# Patient Record
Sex: MALE | Race: WHITE | NOT HISPANIC OR LATINO
[De-identification: names, ages, dates, MRNs, and addresses within clinical notes are randomized per-mention and may not be internally consistent; named-entity substitution may affect disease eponyms.]

---

## 2022-05-31 ENCOUNTER — APPOINTMENT (OUTPATIENT)
Dept: ORTHOPEDIC SURGERY | Facility: CLINIC | Age: 82
End: 2022-05-31
Payer: MEDICARE

## 2022-05-31 VITALS — HEIGHT: 70 IN | WEIGHT: 165 LBS | BODY MASS INDEX: 23.62 KG/M2

## 2022-05-31 DIAGNOSIS — I10 ESSENTIAL (PRIMARY) HYPERTENSION: ICD-10-CM

## 2022-05-31 PROCEDURE — 20611 DRAIN/INJ JOINT/BURSA W/US: CPT

## 2022-05-31 PROCEDURE — 99213 OFFICE O/P EST LOW 20 MIN: CPT | Mod: 25

## 2022-05-31 RX ORDER — AMLODIPINE BESYLATE 10 MG/1
10 TABLET ORAL
Qty: 90 | Refills: 0 | Status: ACTIVE | COMMUNITY
Start: 2022-01-14

## 2022-05-31 RX ORDER — ATORVASTATIN CALCIUM 10 MG/1
10 TABLET, FILM COATED ORAL
Qty: 90 | Refills: 0 | Status: ACTIVE | COMMUNITY
Start: 2021-11-15

## 2022-05-31 RX ORDER — TROSPIUM CHLORIDE 20 MG/1
20 TABLET, FILM COATED ORAL
Qty: 180 | Refills: 0 | Status: ACTIVE | COMMUNITY
Start: 2021-10-11

## 2022-05-31 RX ORDER — NITROFURANTOIN (MONOHYDRATE/MACROCRYSTALS) 25; 75 MG/1; MG/1
100 CAPSULE ORAL
Qty: 10 | Refills: 0 | Status: ACTIVE | COMMUNITY
Start: 2022-04-18

## 2022-05-31 RX ORDER — TAMSULOSIN HYDROCHLORIDE 0.4 MG/1
0.4 CAPSULE ORAL
Qty: 180 | Refills: 0 | Status: ACTIVE | COMMUNITY
Start: 2021-10-11

## 2022-05-31 RX ORDER — LEVOCETIRIZINE DIHYDROCHLORIDE 5 MG/1
5 TABLET ORAL
Qty: 90 | Refills: 0 | Status: ACTIVE | COMMUNITY
Start: 2021-12-09

## 2022-05-31 RX ORDER — CIPROFLOXACIN HYDROCHLORIDE 500 MG/1
500 TABLET, FILM COATED ORAL
Qty: 14 | Refills: 0 | Status: ACTIVE | COMMUNITY
Start: 2022-04-12

## 2022-05-31 RX ORDER — CLONAZEPAM 0.5 MG/1
0.5 TABLET ORAL
Qty: 15 | Refills: 0 | Status: ACTIVE | COMMUNITY
Start: 2022-04-25

## 2022-05-31 RX ORDER — PANTOPRAZOLE 40 MG/1
40 TABLET, DELAYED RELEASE ORAL
Qty: 90 | Refills: 0 | Status: ACTIVE | COMMUNITY
Start: 2022-05-05

## 2022-05-31 NOTE — HISTORY OF PRESENT ILLNESS
[6] : 6 [de-identified] : 5/31/22: Here for R knee orthovisc #1.\par \par 3/15/22: Here for follow up, he had an episode of the R knee giving out underneath him. He is limping a little. He is in PT for balance.\par \par 11/23/21: here for b/l knee orthovisc #4.\par \par 11/9/21: here for b/l knee orthovisc #2.\par \par 11/2/21: Here for bilateral knee orthovoisc x 1.\par \par 7/6/21: Bilateral shoulder synvisc #3 today.\par \par 6/29/21: Bilateral shoulder synvisc #2 today.\par \par 6/22/21: Here for b/l shoulder synvisc today.\par \par 6/8/21: Here for follow up. His knees have improved with orthovisc. His right shoulder is more painful after lifting his wife (who passed recently). Pain is anterior.\par \par 3/30/21: Here for bilateral knee orthovisc #4\par \par 3/23/21 Orthovisc #3 bilateral knees.\par \par 3/16/21: Follow up b/l knees. Here for Orthovisc #2\par \par 3/9/21: Here today for b/l knee orthovisc #1.\par \par 3/2/21: Here today for b/l knees. R is worse than left. Prior rounds of visco injections had been giving him excellent relief.\par \par 5/5/2020- Here for right knee Synvisc #3\par \par 3/17/20: Here for R knee, recurring pain and symptoms to repeat visco series. Continues to benefit from visco series shoulders.\par \par 1/24/20: The right knee has become more painful. He had a lot of pain and popping recently.\par \par 9/10/19: Here for R knee orthovisc #4.\par \par 9/3/19: Here for R knee orthovisc #3.\par \par 8/27/19: Here for R knee orthovisc #2.\par \par 8/20/19: Here to start orthovisc series right knee.\par \par 7/9/19: Here for follow up. He had mild relief from the orthovisc in the knee. The shoulders feel great however.\par \par 2/19/19: Here for follow up, orthovisc #4 right knee.\par \par 2/12/19: Here for follow up, orthovisc #3 right knee.\par \par 2/5/19: Here for follow up, orthovisc #2 right knee. 1/29/19: Here to continue Orthovisc series, #4 today. 80% relief with injection so far. Able to sleep on his side now. Now with recurring R knee pain and symptoms. Pain getting up from a seated position and stiffness with motion. Did well with Orthovisc in the past. Requesting to repeat.\par \par 1/22/19: Here for follow up, orthovisc #3.\par \par 1/15/19: Here for follow up, orthovisc b/l shoulders #2. He notes relief on the R side.\par \par 1/8/19: Here for follow up. The injection last visit has worn off in December. He wants to consider the gel. He is also having left shoulder pain, mostly in the morning.\par \par 10/23/18: Here for follow up. He has been improved since the last injection, wore off a few weeks ago.\par \par 7/24/18: 78 y/o RHD male here for the R shoulder. He has had pain for 7-8 years. He has pain in the anterior shoulder, pain with activity. He has had cortisone injections, most recently more than 6 months ago. He also tried PT.\par \par MRI R shoulder: Full-thickness and partial tear of supraspinatus tendon. Low-grade partial tear of infraspinatus and high-grade partial tear of subscapularis tendons. Biceps tendon tear. Mild glenohumeral joint degenerative disease with superior migration. Moderate acromioclavicular joint degenerative disease with superior periarticular ganglion cyst formation. [FreeTextEntry1] : R knee

## 2022-05-31 NOTE — PHYSICAL EXAM
[Right] : right knee [NL (0)] : extension 0 degrees [] : light touch is intact throughout [TWNoteComboBox7] : flexion 120 degrees

## 2022-05-31 NOTE — ASSESSMENT
[FreeTextEntry1] : R RTC arthropathy > L RTC arthropathy.\par Significant improvement in pain and motion with visco series.\par Bilateral GH CSI 6/8/21.\par Prior B/L synvisc with relief.\par \par R > L knee OA.\par Prior orthovisc with relief.\par Bilateral knee CSI given 3/2/21\par B/l knee orthovisc completed on 11/23/21.\par R knee orthovisc #1.\par RTO 1 week to continue\par \par Procedure note:\par Viscosupplementation Injection: X-ray evidence of Osteoarthritis on this or prior visit and Patient has tried OTC's including aspirin, Ibuprofen, Aleve etc or prescription NSAIDs, and/or exercises at home and/ or physical therapy without satisfactory response. \par \par An injection of Orthovisc 2ml was injected into the right knee. after verbal consent using sterile technique. The risks, benefits, and alternatives to Viscosupplementation injection were explained in full to the patient. Risks outlined include but are not limited to infection, sepsis, bleeding, scarring, skin discoloration, temporary increase in pain, syncopal episode, failure to resolve symptoms, allergic reaction, and symptom recurrence. Signs and symptoms of infection reviewed and patient advised to call immediately for redness, fevers, and/or chills. Patient understood the risks. All questions were answered. After discussion of options, patient requested Viscosupplementation. Oral informed consent was obtained and sterile prep of the injection site was performed using alcohol. Sterile technique was utilized for the procedure including the preparation of the solutions used for the injection. Ethyl chloride spray was used topically.  Sterile technique used. Patient tolerated procedure well. Post Procedure Instructions: Patient was advised to call if redness, pain, or fever occur and apply ice for 15 min. out of every hour for the next 12-24 hours as tolerated. patient was advised to rest the joint(s) for 2 days.\par \par Ultrasound Guidance was used for the following reasons: for Glenohumeral injection. \par Ultrasound guided injection was performed of the shoulder, visualization of the needle and placement of injection was performed without complication.\par

## 2022-06-07 ENCOUNTER — APPOINTMENT (OUTPATIENT)
Dept: ORTHOPEDIC SURGERY | Facility: CLINIC | Age: 82
End: 2022-06-07
Payer: MEDICARE

## 2022-06-07 PROCEDURE — 99213 OFFICE O/P EST LOW 20 MIN: CPT | Mod: 25

## 2022-06-07 PROCEDURE — 20611 DRAIN/INJ JOINT/BURSA W/US: CPT

## 2022-06-07 RX ORDER — DICLOFENAC SODIUM 20 MG/G
2 SOLUTION TOPICAL TWICE DAILY
Qty: 3 | Refills: 0 | Status: ACTIVE | COMMUNITY
Start: 2022-06-07 | End: 1900-01-01

## 2022-06-07 NOTE — ASSESSMENT
[FreeTextEntry1] : R RTC arthropathy > L RTC arthropathy.\par Significant improvement in pain and motion with visco series.\par Bilateral GH CSI 6/8/21.\par Prior B/L synvisc with relief.\par \par R > L knee OA.\par Prior orthovisc with relief.\par Bilateral knee CSI given 3/2/21\par B/l knee orthovisc completed on 11/23/21.\par R knee orthovisc #2.\par RTO 1 week to continue\par \par Procedure note:\par Viscosupplementation Injection: X-ray evidence of Osteoarthritis on this or prior visit and Patient has tried OTC's including aspirin, Ibuprofen, Aleve etc or prescription NSAIDs, and/or exercises at home and/ or physical therapy without satisfactory response. \par \par An injection of Orthovisc 2ml was injected into the right knee. after verbal consent using sterile technique. The risks, benefits, and alternatives to Viscosupplementation injection were explained in full to the patient. Risks outlined include but are not limited to infection, sepsis, bleeding, scarring, skin discoloration, temporary increase in pain, syncopal episode, failure to resolve symptoms, allergic reaction, and symptom recurrence. Signs and symptoms of infection reviewed and patient advised to call immediately for redness, fevers, and/or chills. Patient understood the risks. All questions were answered. After discussion of options, patient requested Viscosupplementation. Oral informed consent was obtained and sterile prep of the injection site was performed using alcohol. Sterile technique was utilized for the procedure including the preparation of the solutions used for the injection. Ethyl chloride spray was used topically.  Sterile technique used. Patient tolerated procedure well. Post Procedure Instructions: Patient was advised to call if redness, pain, or fever occur and apply ice for 15 min. out of every hour for the next 12-24 hours as tolerated. patient was advised to rest the joint(s) for 2 days.\par \par Ultrasound Guidance was used for the following reasons: for Glenohumeral injection. \par Ultrasound guided injection was performed of the shoulder, visualization of the needle and placement of injection was performed without complication.\par

## 2022-06-07 NOTE — HISTORY OF PRESENT ILLNESS
[6] : 6 [de-identified] : 6/7/22: Right knee orthovisc #2.\par \par 5/31/22: Here for R knee orthovisc #1.\par \par 3/15/22: Here for follow up, he had an episode of the R knee giving out underneath him. He is limping a little. He is in PT for balance.\par \par 11/23/21: here for b/l knee orthovisc #4.\par \par 11/9/21: here for b/l knee orthovisc #2.\par \par 11/2/21: Here for bilateral knee orthovoisc x 1.\par \par 7/6/21: Bilateral shoulder synvisc #3 today.\par \par 6/29/21: Bilateral shoulder synvisc #2 today.\par \par 6/22/21: Here for b/l shoulder synvisc today.\par \par 6/8/21: Here for follow up. His knees have improved with orthovisc. His right shoulder is more painful after lifting his wife (who passed recently). Pain is anterior.\par \par 3/30/21: Here for bilateral knee orthovisc #4\par \par 3/23/21 Orthovisc #3 bilateral knees.\par \par 3/16/21: Follow up b/l knees. Here for Orthovisc #2\par \par 3/9/21: Here today for b/l knee orthovisc #1.\par \par 3/2/21: Here today for b/l knees. R is worse than left. Prior rounds of visco injections had been giving him excellent relief.\par \par 5/5/2020- Here for right knee Synvisc #3\par \par 3/17/20: Here for R knee, recurring pain and symptoms to repeat visco series. Continues to benefit from visco series shoulders.\par \par 1/24/20: The right knee has become more painful. He had a lot of pain and popping recently.\par \par 9/10/19: Here for R knee orthovisc #4.\par \par 9/3/19: Here for R knee orthovisc #3.\par \par 8/27/19: Here for R knee orthovisc #2.\par \par 8/20/19: Here to start orthovisc series right knee.\par \par 7/9/19: Here for follow up. He had mild relief from the orthovisc in the knee. The shoulders feel great however.\par \par 2/19/19: Here for follow up, orthovisc #4 right knee.\par \par 2/12/19: Here for follow up, orthovisc #3 right knee.\par \par 2/5/19: Here for follow up, orthovisc #2 right knee. 1/29/19: Here to continue Orthovisc series, #4 today. 80% relief with injection so far. Able to sleep on his side now. Now with recurring R knee pain and symptoms. Pain getting up from a seated position and stiffness with motion. Did well with Orthovisc in the past. Requesting to repeat.\par \par 1/22/19: Here for follow up, orthovisc #3.\par \par 1/15/19: Here for follow up, orthovisc b/l shoulders #2. He notes relief on the R side.\par \par 1/8/19: Here for follow up. The injection last visit has worn off in December. He wants to consider the gel. He is also having left shoulder pain, mostly in the morning.\par \par 10/23/18: Here for follow up. He has been improved since the last injection, wore off a few weeks ago.\par \par 7/24/18: 76 y/o RHD male here for the R shoulder. He has had pain for 7-8 years. He has pain in the anterior shoulder, pain with activity. He has had cortisone injections, most recently more than 6 months ago. He also tried PT.\par \par MRI R shoulder: Full-thickness and partial tear of supraspinatus tendon. Low-grade partial tear of infraspinatus and high-grade partial tear of subscapularis tendons. Biceps tendon tear. Mild glenohumeral joint degenerative disease with superior migration. Moderate acromioclavicular joint degenerative disease with superior periarticular ganglion cyst formation. [FreeTextEntry1] : R knee

## 2022-06-14 ENCOUNTER — APPOINTMENT (OUTPATIENT)
Dept: ORTHOPEDIC SURGERY | Facility: CLINIC | Age: 82
End: 2022-06-14
Payer: MEDICARE

## 2022-06-14 VITALS — WEIGHT: 165 LBS | BODY MASS INDEX: 23.62 KG/M2 | HEIGHT: 70 IN

## 2022-06-14 PROCEDURE — 99213 OFFICE O/P EST LOW 20 MIN: CPT | Mod: 25

## 2022-06-14 PROCEDURE — 20611 DRAIN/INJ JOINT/BURSA W/US: CPT

## 2022-06-14 NOTE — ASSESSMENT
[FreeTextEntry1] : R RTC arthropathy > L RTC arthropathy.\par Significant improvement in pain and motion with visco series.\par Bilateral GH CSI 6/8/21.\par Prior B/L synvisc with relief.\par \par R > L knee OA.\par Prior orthovisc with relief.\par Bilateral knee CSI given 3/2/21\par B/l knee orthovisc completed on 11/23/21.\par R knee orthovisc #3.\par RTO 1 week to continue\par \par Procedure note:\par Viscosupplementation Injection: X-ray evidence of Osteoarthritis on this or prior visit and Patient has tried OTC's including aspirin, Ibuprofen, Aleve etc or prescription NSAIDs, and/or exercises at home and/ or physical therapy without satisfactory response. \par \par An injection of Orthovisc 2ml was injected into the right knee. after verbal consent using sterile technique. The risks, benefits, and alternatives to Viscosupplementation injection were explained in full to the patient. Risks outlined include but are not limited to infection, sepsis, bleeding, scarring, skin discoloration, temporary increase in pain, syncopal episode, failure to resolve symptoms, allergic reaction, and symptom recurrence. Signs and symptoms of infection reviewed and patient advised to call immediately for redness, fevers, and/or chills. Patient understood the risks. All questions were answered. After discussion of options, patient requested Viscosupplementation. Oral informed consent was obtained and sterile prep of the injection site was performed using alcohol. Sterile technique was utilized for the procedure including the preparation of the solutions used for the injection. Ethyl chloride spray was used topically.  Sterile technique used. Patient tolerated procedure well. Post Procedure Instructions: Patient was advised to call if redness, pain, or fever occur and apply ice for 15 min. out of every hour for the next 12-24 hours as tolerated. patient was advised to rest the joint(s) for 2 days.\par \par Ultrasound Guidance was used for the following reasons: for Glenohumeral injection. \par Ultrasound guided injection was performed of the shoulder, visualization of the needle and placement of injection was performed without complication.\par

## 2022-06-14 NOTE — HISTORY OF PRESENT ILLNESS
[6] : 6 [de-identified] : 6/14/22: Right knee orthovisc #3.\par \par 6/7/22: Right knee orthovisc #2.\par \par 5/31/22: Here for R knee orthovisc #1.\par \par 3/15/22: Here for follow up, he had an episode of the R knee giving out underneath him. He is limping a little. He is in PT for balance.\par \par 11/23/21: here for b/l knee orthovisc #4.\par \par 11/9/21: here for b/l knee orthovisc #2.\par \par 11/2/21: Here for bilateral knee orthovoisc x 1.\par \par 7/6/21: Bilateral shoulder synvisc #3 today.\par \par 6/29/21: Bilateral shoulder synvisc #2 today.\par \par 6/22/21: Here for b/l shoulder synvisc today.\par \par 6/8/21: Here for follow up. His knees have improved with orthovisc. His right shoulder is more painful after lifting his wife (who passed recently). Pain is anterior.\par \par 3/30/21: Here for bilateral knee orthovisc #4\par \par 3/23/21 Orthovisc #3 bilateral knees.\par \par 3/16/21: Follow up b/l knees. Here for Orthovisc #2\par \par 3/9/21: Here today for b/l knee orthovisc #1.\par \par 3/2/21: Here today for b/l knees. R is worse than left. Prior rounds of visco injections had been giving him excellent relief.\par \par 5/5/2020- Here for right knee Synvisc #3\par \par 3/17/20: Here for R knee, recurring pain and symptoms to repeat visco series. Continues to benefit from visco series shoulders.\par \par 1/24/20: The right knee has become more painful. He had a lot of pain and popping recently.\par \par 9/10/19: Here for R knee orthovisc #4.\par \par 9/3/19: Here for R knee orthovisc #3.\par \par 8/27/19: Here for R knee orthovisc #2.\par \par 8/20/19: Here to start orthovisc series right knee.\par \par 7/9/19: Here for follow up. He had mild relief from the orthovisc in the knee. The shoulders feel great however.\par \par 2/19/19: Here for follow up, orthovisc #4 right knee.\par \par 2/12/19: Here for follow up, orthovisc #3 right knee.\par \par 2/5/19: Here for follow up, orthovisc #2 right knee. 1/29/19: Here to continue Orthovisc series, #4 today. 80% relief with injection so far. Able to sleep on his side now. Now with recurring R knee pain and symptoms. Pain getting up from a seated position and stiffness with motion. Did well with Orthovisc in the past. Requesting to repeat.\par \par 1/22/19: Here for follow up, orthovisc #3.\par \par 1/15/19: Here for follow up, orthovisc b/l shoulders #2. He notes relief on the R side.\par \par 1/8/19: Here for follow up. The injection last visit has worn off in December. He wants to consider the gel. He is also having left shoulder pain, mostly in the morning.\par \par 10/23/18: Here for follow up. He has been improved since the last injection, wore off a few weeks ago.\par \par 7/24/18: 76 y/o RHD male here for the R shoulder. He has had pain for 7-8 years. He has pain in the anterior shoulder, pain with activity. He has had cortisone injections, most recently more than 6 months ago. He also tried PT.\par \par MRI R shoulder: Full-thickness and partial tear of supraspinatus tendon. Low-grade partial tear of infraspinatus and high-grade partial tear of subscapularis tendons. Biceps tendon tear. Mild glenohumeral joint degenerative disease with superior migration. Moderate acromioclavicular joint degenerative disease with superior periarticular ganglion cyst formation. [FreeTextEntry1] : R knee

## 2022-06-21 ENCOUNTER — APPOINTMENT (OUTPATIENT)
Dept: ORTHOPEDIC SURGERY | Facility: CLINIC | Age: 82
End: 2022-06-21
Payer: MEDICARE

## 2022-06-21 VITALS — BODY MASS INDEX: 23.62 KG/M2 | HEIGHT: 70 IN | WEIGHT: 165 LBS

## 2022-06-21 PROCEDURE — 20611 DRAIN/INJ JOINT/BURSA W/US: CPT

## 2022-06-21 PROCEDURE — 99212 OFFICE O/P EST SF 10 MIN: CPT | Mod: 25

## 2022-06-21 NOTE — HISTORY OF PRESENT ILLNESS
[6] : 6 [de-identified] : 6/14/22: Right knee orthovisc #3.\par \par 6/7/22: Right knee orthovisc #2.\par \par 5/31/22: Here for R knee orthovisc #1.\par \par 3/15/22: Here for follow up, he had an episode of the R knee giving out underneath him. He is limping a little. He is in PT for balance.\par \par 11/23/21: here for b/l knee orthovisc #4.\par \par 11/9/21: here for b/l knee orthovisc #2.\par \par 11/2/21: Here for bilateral knee orthovoisc x 1.\par \par 7/6/21: Bilateral shoulder synvisc #3 today.\par \par 6/29/21: Bilateral shoulder synvisc #2 today.\par \par 6/22/21: Here for b/l shoulder synvisc today.\par \par 6/8/21: Here for follow up. His knees have improved with orthovisc. His right shoulder is more painful after lifting his wife (who passed recently). Pain is anterior.\par \par 3/30/21: Here for bilateral knee orthovisc #4\par \par 3/23/21 Orthovisc #3 bilateral knees.\par \par 3/16/21: Follow up b/l knees. Here for Orthovisc #2\par \par 3/9/21: Here today for b/l knee orthovisc #1.\par \par 3/2/21: Here today for b/l knees. R is worse than left. Prior rounds of visco injections had been giving him excellent relief.\par \par 5/5/2020- Here for right knee Synvisc #3\par \par 3/17/20: Here for R knee, recurring pain and symptoms to repeat visco series. Continues to benefit from visco series shoulders.\par \par 1/24/20: The right knee has become more painful. He had a lot of pain and popping recently.\par \par 9/10/19: Here for R knee orthovisc #4.\par \par 9/3/19: Here for R knee orthovisc #3.\par \par 8/27/19: Here for R knee orthovisc #2.\par \par 8/20/19: Here to start orthovisc series right knee.\par \par 7/9/19: Here for follow up. He had mild relief from the orthovisc in the knee. The shoulders feel great however.\par \par 2/19/19: Here for follow up, orthovisc #4 right knee.\par \par 2/12/19: Here for follow up, orthovisc #3 right knee.\par \par 2/5/19: Here for follow up, orthovisc #2 right knee. 1/29/19: Here to continue Orthovisc series, #4 today. 80% relief with injection so far. Able to sleep on his side now. Now with recurring R knee pain and symptoms. Pain getting up from a seated position and stiffness with motion. Did well with Orthovisc in the past. Requesting to repeat.\par \par 1/22/19: Here for follow up, orthovisc #3.\par \par 1/15/19: Here for follow up, orthovisc b/l shoulders #2. He notes relief on the R side.\par \par 1/8/19: Here for follow up. The injection last visit has worn off in December. He wants to consider the gel. He is also having left shoulder pain, mostly in the morning.\par \par 10/23/18: Here for follow up. He has been improved since the last injection, wore off a few weeks ago.\par \par 7/24/18: 76 y/o RHD male here for the R shoulder. He has had pain for 7-8 years. He has pain in the anterior shoulder, pain with activity. He has had cortisone injections, most recently more than 6 months ago. He also tried PT.\par \par MRI R shoulder: Full-thickness and partial tear of supraspinatus tendon. Low-grade partial tear of infraspinatus and high-grade partial tear of subscapularis tendons. Biceps tendon tear. Mild glenohumeral joint degenerative disease with superior migration. Moderate acromioclavicular joint degenerative disease with superior periarticular ganglion cyst formation. [FreeTextEntry1] : R knee

## 2022-06-21 NOTE — ASSESSMENT
[FreeTextEntry1] : R RTC arthropathy > L RTC arthropathy.\par Significant improvement in pain and motion with visco series.\par Bilateral GH CSI 6/8/21.\par Prior B/L synvisc with relief.\par \par R > L knee OA.\par Prior orthovisc with relief.\par Bilateral knee CSI given 3/2/21\par B/l knee orthovisc completed on 11/23/21.\par R knee orthovisc #4.\par RTO prn. \par

## 2023-04-25 ENCOUNTER — APPOINTMENT (OUTPATIENT)
Dept: ORTHOPEDIC SURGERY | Facility: CLINIC | Age: 83
End: 2023-04-25
Payer: MEDICARE

## 2023-04-25 VITALS — WEIGHT: 165 LBS | BODY MASS INDEX: 23.62 KG/M2 | HEIGHT: 70 IN

## 2023-04-25 PROCEDURE — 99214 OFFICE O/P EST MOD 30 MIN: CPT | Mod: 25

## 2023-04-25 PROCEDURE — 20611 DRAIN/INJ JOINT/BURSA W/US: CPT | Mod: 50

## 2023-04-25 PROCEDURE — J3490M: CUSTOM

## 2023-04-25 PROCEDURE — 73030 X-RAY EXAM OF SHOULDER: CPT | Mod: RT

## 2023-04-25 PROCEDURE — 73010 X-RAY EXAM OF SHOULDER BLADE: CPT | Mod: RT

## 2023-04-25 NOTE — HISTORY OF PRESENT ILLNESS
[6] : 6 [de-identified] : 4/25/23: Here today for bilateral shoulders.  He has pain at night.  He takes aleve.  He feels pain deep in the shoulder, some down the arm.  \par \par 6/14/22: Right knee orthovisc #3.\par \par 6/7/22: Right knee orthovisc #2.\par \par 5/31/22: Here for R knee orthovisc #1.\par \par 3/15/22: Here for follow up, he had an episode of the R knee giving out underneath him. He is limping a little. He is in PT for balance.\par \par 11/23/21: here for b/l knee orthovisc #4.\par \par 11/9/21: here for b/l knee orthovisc #2.\par \par 11/2/21: Here for bilateral knee orthovoisc x 1.\par \par 7/6/21: Bilateral shoulder synvisc #3 today.\par \par 6/29/21: Bilateral shoulder synvisc #2 today.\par \par 6/22/21: Here for b/l shoulder synvisc today.\par \par 6/8/21: Here for follow up. His knees have improved with orthovisc. His right shoulder is more painful after lifting his wife (who passed recently). Pain is anterior.\par \par 3/30/21: Here for bilateral knee orthovisc #4\par \par 3/23/21 Orthovisc #3 bilateral knees.\par \par 3/16/21: Follow up b/l knees. Here for Orthovisc #2\par \par 3/9/21: Here today for b/l knee orthovisc #1.\par \par 3/2/21: Here today for b/l knees. R is worse than left. Prior rounds of visco injections had been giving him excellent relief.\par \par 5/5/2020- Here for right knee Synvisc #3\par \par 3/17/20: Here for R knee, recurring pain and symptoms to repeat visco series. Continues to benefit from visco series shoulders.\par \par 1/24/20: The right knee has become more painful. He had a lot of pain and popping recently.\par \par 9/10/19: Here for R knee orthovisc #4.\par \par 9/3/19: Here for R knee orthovisc #3.\par \par 8/27/19: Here for R knee orthovisc #2.\par \par 8/20/19: Here to start orthovisc series right knee.\par \par 7/9/19: Here for follow up. He had mild relief from the orthovisc in the knee. The shoulders feel great however.\par \par 2/19/19: Here for follow up, orthovisc #4 right knee.\par \par 2/12/19: Here for follow up, orthovisc #3 right knee.\par \par 2/5/19: Here for follow up, orthovisc #2 right knee. 1/29/19: Here to continue Orthovisc series, #4 today. 80% relief with injection so far. Able to sleep on his side now. Now with recurring R knee pain and symptoms. Pain getting up from a seated position and stiffness with motion. Did well with Orthovisc in the past. Requesting to repeat.\par \par 1/22/19: Here for follow up, orthovisc #3.\par \par 1/15/19: Here for follow up, orthovisc b/l shoulders #2. He notes relief on the R side.\par \par 1/8/19: Here for follow up. The injection last visit has worn off in December. He wants to consider the gel. He is also having left shoulder pain, mostly in the morning.\par \par 10/23/18: Here for follow up. He has been improved since the last injection, wore off a few weeks ago.\par \par 7/24/18: 78 y/o RHD male here for the R shoulder. He has had pain for 7-8 years. He has pain in the anterior shoulder, pain with activity. He has had cortisone injections, most recently more than 6 months ago. He also tried PT.\par \par MRI R shoulder: Full-thickness and partial tear of supraspinatus tendon. Low-grade partial tear of infraspinatus and high-grade partial tear of subscapularis tendons. Biceps tendon tear. Mild glenohumeral joint degenerative disease with superior migration. Moderate acromioclavicular joint degenerative disease with superior periarticular ganglion cyst formation. [FreeTextEntry1] : R knee

## 2023-04-25 NOTE — PHYSICAL EXAM
[Bilateral] : shoulder bilaterally [5 ___] : forward flexion 5[unfilled]/5 [5___] : external rotation 5[unfilled]/5 [] : positive Mary Carmen [FreeTextEntry9] : FE: 120 b/l

## 2023-04-25 NOTE — ASSESSMENT
[FreeTextEntry1] : R RTC arthropathy > L RTC arthropathy.\par Significant improvement in pain and motion with visco series.\par Prior B/L synvisc with relief.\par B/l SA injections today.\par Consider orthovisc series. \par \par R > L knee OA.\par Prior orthovisc with relief.\par Bilateral knee CSI given 3/2/21\par R knee orthovisc completed 6/21/22.\par We will submit for orthovisc R knee x 4\par RTO prn. \par \par Procedure Note:\par Large Joint Injection was performed because of pain and inflammation, failure of conservative treatment.  \par Medications:\par Depo-Medrol: 1 cc, 80 mg.\par Lidocaine: 2 cc, 1%. \par Marcaine: 2 cc, .25%. \par \par Medication was injected in the bilateral subacromial spaces. Patient has tried OTC's including aspirin, Ibuprofen, Aleve etc or prescription NSAIDs, and/or exercises at home and/ or physical therapy without satisfactory response. The risks, benefits, and alternatives to cortisone injection were explained in full to the patient. Risks outlined include but are not limited to infection, sepsis, bleeding, scarring, skin discoloration, temporary increase in pain, syncopal episode, failure to resolve symptoms, allergic reaction, symptom recurrence, and elevation of blood sugar in diabetics. Patient understood the risks. All questions were answered. After discussion of options, patient requested an injection. Oral informed consent was obtained and sterile prep of the injection site was performed using alcohol. Sterile technique was utilized for the procedure including the preparation of the solutions used for the injection. Ethyl chloride spray was used topically.  Sterile technique used. Patient tolerated procedure well. Post Procedure Instructions: Patient was advised to call if redness, pain, or fever occur and apply ice for 15 min. out of every hour for the next 12-24 hours as tolerated. patient was advised to rest the joint(s) for 2 days.\par \par Ultrasound Guidance was used for the following reasons: for prior failure or difficult injection and to visualize tearing and inflammation.\par Ultrasound guided injection was performed of the shoulder, visualization of the needle and placement of injection was performed without complication.\par

## 2023-05-09 ENCOUNTER — APPOINTMENT (OUTPATIENT)
Dept: ORTHOPEDIC SURGERY | Facility: CLINIC | Age: 83
End: 2023-05-09
Payer: MEDICARE

## 2023-05-09 VITALS — WEIGHT: 165 LBS | BODY MASS INDEX: 23.62 KG/M2 | HEIGHT: 70 IN

## 2023-05-09 PROCEDURE — 20611 DRAIN/INJ JOINT/BURSA W/US: CPT | Mod: RT

## 2023-05-09 NOTE — PHYSICAL EXAM
[Bilateral] : shoulder bilaterally [5 ___] : forward flexion 5[unfilled]/5 [5___] : external rotation 5[unfilled]/5 [] : no erythema [FreeTextEntry9] : FE: 120 b/l

## 2023-05-09 NOTE — HISTORY OF PRESENT ILLNESS
[6] : 6 [de-identified] : 5/9/23: Here for right knee orthovisc #1. He states injections to shoulders last time helped his right but not left.\par \par 4/25/23: Here today for bilateral shoulders.  He has pain at night.  He takes aleve.  He feels pain deep in the shoulder, some down the arm.  \par \par 6/14/22: Right knee orthovisc #3.\par \par 6/7/22: Right knee orthovisc #2.\par \par 5/31/22: Here for R knee orthovisc #1.\par \par 3/15/22: Here for follow up, he had an episode of the R knee giving out underneath him. He is limping a little. He is in PT for balance.\par \par 11/23/21: here for b/l knee orthovisc #4.\par \par 11/9/21: here for b/l knee orthovisc #2.\par \par 11/2/21: Here for bilateral knee orthovoisc x 1.\par \par 7/6/21: Bilateral shoulder synvisc #3 today.\par \par 6/29/21: Bilateral shoulder synvisc #2 today.\par \par 6/22/21: Here for b/l shoulder synvisc today.\par \par 6/8/21: Here for follow up. His knees have improved with orthovisc. His right shoulder is more painful after lifting his wife (who passed recently). Pain is anterior.\par \par 3/30/21: Here for bilateral knee orthovisc #4\par \par 3/23/21 Orthovisc #3 bilateral knees.\par \par 3/16/21: Follow up b/l knees. Here for Orthovisc #2\par \par 3/9/21: Here today for b/l knee orthovisc #1.\par \par 3/2/21: Here today for b/l knees. R is worse than left. Prior rounds of visco injections had been giving him excellent relief.\par \par 5/5/2020- Here for right knee Synvisc #3\par \par 3/17/20: Here for R knee, recurring pain and symptoms to repeat visco series. Continues to benefit from visco series shoulders.\par \par 1/24/20: The right knee has become more painful. He had a lot of pain and popping recently.\par \par 9/10/19: Here for R knee orthovisc #4.\par \par 9/3/19: Here for R knee orthovisc #3.\par \par 8/27/19: Here for R knee orthovisc #2.\par \par 8/20/19: Here to start orthovisc series right knee.\par \par 7/9/19: Here for follow up. He had mild relief from the orthovisc in the knee. The shoulders feel great however.\par \par 2/19/19: Here for follow up, orthovisc #4 right knee.\par \par 2/12/19: Here for follow up, orthovisc #3 right knee.\par \par 2/5/19: Here for follow up, orthovisc #2 right knee. 1/29/19: Here to continue Orthovisc series, #4 today. 80% relief with injection so far. Able to sleep on his side now. Now with recurring R knee pain and symptoms. Pain getting up from a seated position and stiffness with motion. Did well with Orthovisc in the past. Requesting to repeat.\par \par 1/22/19: Here for follow up, orthovisc #3.\par \par 1/15/19: Here for follow up, orthovisc b/l shoulders #2. He notes relief on the R side.\par \par 1/8/19: Here for follow up. The injection last visit has worn off in December. He wants to consider the gel. He is also having left shoulder pain, mostly in the morning.\par \par 10/23/18: Here for follow up. He has been improved since the last injection, wore off a few weeks ago.\par \par 7/24/18: 78 y/o RHD male here for the R shoulder. He has had pain for 7-8 years. He has pain in the anterior shoulder, pain with activity. He has had cortisone injections, most recently more than 6 months ago. He also tried PT.\par \par MRI R shoulder: Full-thickness and partial tear of supraspinatus tendon. Low-grade partial tear of infraspinatus and high-grade partial tear of subscapularis tendons. Biceps tendon tear. Mild glenohumeral joint degenerative disease with superior migration. Moderate acromioclavicular joint degenerative disease with superior periarticular ganglion cyst formation. [FreeTextEntry1] : R knee

## 2023-05-09 NOTE — ASSESSMENT
[FreeTextEntry1] : R RTC arthropathy > L RTC arthropathy.\par Significant improvement in pain and motion with visco series.\par Prior B/L synvisc with relief.\par B/l SA injections 4/25/23.\par Consider orthovisc series. \par \par R > L knee OA.\par Prior orthovisc with relief.\par Bilateral knee CSI given 3/2/21\par R knee orthovisc completed 6/21/22.\par Orthovisc R knee #1 given.\par RTO 1 week.\par \par Procedure note:\par Viscosupplementation Injection: X-ray evidence of Osteoarthritis on this or prior visit and Patient has tried OTC's including aspirin, Ibuprofen, Aleve etc or prescription NSAIDs, and/or exercises at home and/ or physical therapy without satisfactory response. \par \par An injection of Orthovisc 2ml was injected into the right knee. after verbal consent using sterile technique. The risks, benefits, and alternatives to Viscosupplementation injection were explained in full to the patient. Risks outlined include but are not limited to infection, sepsis, bleeding, scarring, skin discoloration, temporary increase in pain, syncopal episode, failure to resolve symptoms, allergic reaction, and symptom recurrence. Signs and symptoms of infection reviewed and patient advised to call immediately for redness, fevers, and/or chills. Patient understood the risks. All questions were answered. After discussion of options, patient requested Viscosupplementation. Oral informed consent was obtained and sterile prep of the injection site was performed using alcohol. Sterile technique was utilized for the procedure including the preparation of the solutions used for the injection. Ethyl chloride spray was used topically.  Sterile technique used. Patient tolerated procedure well. Post Procedure Instructions: Patient was advised to call if redness, pain, or fever occur and apply ice for 15 min. out of every hour for the next 12-24 hours as tolerated. patient was advised to rest the joint(s) for 2 days.\par \par Ultrasound Guidance was used for the following reasons: for Glenohumeral injection. \par Ultrasound guided injection was performed of the shoulder, visualization of the needle and placement of injection was performed without complication.\par

## 2023-05-16 ENCOUNTER — APPOINTMENT (OUTPATIENT)
Dept: ORTHOPEDIC SURGERY | Facility: CLINIC | Age: 83
End: 2023-05-16
Payer: MEDICARE

## 2023-05-16 VITALS — HEIGHT: 70 IN | WEIGHT: 165 LBS | BODY MASS INDEX: 23.62 KG/M2

## 2023-05-16 DIAGNOSIS — Z78.9 OTHER SPECIFIED HEALTH STATUS: ICD-10-CM

## 2023-05-16 PROCEDURE — 99213 OFFICE O/P EST LOW 20 MIN: CPT | Mod: 25

## 2023-05-16 PROCEDURE — 20611 DRAIN/INJ JOINT/BURSA W/US: CPT | Mod: RT

## 2023-05-16 RX ORDER — DICLOFENAC SODIUM 20 MG/G
2 SOLUTION TOPICAL TWICE DAILY
Qty: 1 | Refills: 3 | Status: ACTIVE | COMMUNITY
Start: 2022-06-21 | End: 1900-01-01

## 2023-05-16 NOTE — HISTORY OF PRESENT ILLNESS
[6] : 6 [de-identified] : 5/16/23: Right knee orthovisc #2.\par \par 5/9/23: Here for right knee orthovisc #1. He states injections to shoulders last time helped his right but not left.\par \par 4/25/23: Here today for bilateral shoulders.  He has pain at night.  He takes aleve.  He feels pain deep in the shoulder, some down the arm.  \par \par 6/14/22: Right knee orthovisc #3.\par \par 6/7/22: Right knee orthovisc #2.\par \par 5/31/22: Here for R knee orthovisc #1.\par \par 3/15/22: Here for follow up, he had an episode of the R knee giving out underneath him. He is limping a little. He is in PT for balance.\par \par 11/23/21: here for b/l knee orthovisc #4.\par \par 11/9/21: here for b/l knee orthovisc #2.\par \par 11/2/21: Here for bilateral knee orthovoisc x 1.\par \par 7/6/21: Bilateral shoulder synvisc #3 today.\par \par 6/29/21: Bilateral shoulder synvisc #2 today.\par \par 6/22/21: Here for b/l shoulder synvisc today.\par \par 6/8/21: Here for follow up. His knees have improved with orthovisc. His right shoulder is more painful after lifting his wife (who passed recently). Pain is anterior.\par \par 3/30/21: Here for bilateral knee orthovisc #4\par \par 3/23/21 Orthovisc #3 bilateral knees.\par \par 3/16/21: Follow up b/l knees. Here for Orthovisc #2\par \par 3/9/21: Here today for b/l knee orthovisc #1.\par \par 3/2/21: Here today for b/l knees. R is worse than left. Prior rounds of visco injections had been giving him excellent relief.\par \par 5/5/2020- Here for right knee Synvisc #3\par \par 3/17/20: Here for R knee, recurring pain and symptoms to repeat visco series. Continues to benefit from visco series shoulders.\par \par 1/24/20: The right knee has become more painful. He had a lot of pain and popping recently.\par \par 9/10/19: Here for R knee orthovisc #4.\par \par 9/3/19: Here for R knee orthovisc #3.\par \par 8/27/19: Here for R knee orthovisc #2.\par \par 8/20/19: Here to start orthovisc series right knee.\par \par 7/9/19: Here for follow up. He had mild relief from the orthovisc in the knee. The shoulders feel great however.\par \par 2/19/19: Here for follow up, orthovisc #4 right knee.\par \par 2/12/19: Here for follow up, orthovisc #3 right knee.\par \par 2/5/19: Here for follow up, orthovisc #2 right knee. 1/29/19: Here to continue Orthovisc series, #4 today. 80% relief with injection so far. Able to sleep on his side now. Now with recurring R knee pain and symptoms. Pain getting up from a seated position and stiffness with motion. Did well with Orthovisc in the past. Requesting to repeat.\par \par 1/22/19: Here for follow up, orthovisc #3.\par \par 1/15/19: Here for follow up, orthovisc b/l shoulders #2. He notes relief on the R side.\par \par 1/8/19: Here for follow up. The injection last visit has worn off in December. He wants to consider the gel. He is also having left shoulder pain, mostly in the morning.\par \par 10/23/18: Here for follow up. He has been improved since the last injection, wore off a few weeks ago.\par \par 7/24/18: 76 y/o RHD male here for the R shoulder. He has had pain for 7-8 years. He has pain in the anterior shoulder, pain with activity. He has had cortisone injections, most recently more than 6 months ago. He also tried PT.\par \par MRI R shoulder: Full-thickness and partial tear of supraspinatus tendon. Low-grade partial tear of infraspinatus and high-grade partial tear of subscapularis tendons. Biceps tendon tear. Mild glenohumeral joint degenerative disease with superior migration. Moderate acromioclavicular joint degenerative disease with superior periarticular ganglion cyst formation. [FreeTextEntry1] : R knee

## 2023-05-16 NOTE — ASSESSMENT
[FreeTextEntry1] : R RTC arthropathy > L RTC arthropathy.\par Significant improvement in pain and motion with visco series.\par Prior B/L synvisc with relief.\par B/l SA injections 4/25/23.\par Consider orthovisc series. \par \par R > L knee OA.\par Prior orthovisc with relief.\par Bilateral knee CSI given 3/2/21\par R knee orthovisc completed 6/21/22.\par Orthovisc R knee #2 given.\par Rx for diclofenac gel.\par RTO 1 week.\par \par Procedure note:\par Viscosupplementation Injection: X-ray evidence of Osteoarthritis on this or prior visit and Patient has tried OTC's including aspirin, Ibuprofen, Aleve etc or prescription NSAIDs, and/or exercises at home and/ or physical therapy without satisfactory response. \par \par An injection of Orthovisc 2ml was injected into the right knee. after verbal consent using sterile technique. The risks, benefits, and alternatives to Viscosupplementation injection were explained in full to the patient. Risks outlined include but are not limited to infection, sepsis, bleeding, scarring, skin discoloration, temporary increase in pain, syncopal episode, failure to resolve symptoms, allergic reaction, and symptom recurrence. Signs and symptoms of infection reviewed and patient advised to call immediately for redness, fevers, and/or chills. Patient understood the risks. All questions were answered. After discussion of options, patient requested Viscosupplementation. Oral informed consent was obtained and sterile prep of the injection site was performed using alcohol. Sterile technique was utilized for the procedure including the preparation of the solutions used for the injection. Ethyl chloride spray was used topically.  Sterile technique used. Patient tolerated procedure well. Post Procedure Instructions: Patient was advised to call if redness, pain, or fever occur and apply ice for 15 min. out of every hour for the next 12-24 hours as tolerated. patient was advised to rest the joint(s) for 2 days.\par \par Ultrasound Guidance was used for the following reasons: for erosive arthritis. \par Ultrasound guided injection was performed of the knee, visualization of the needle and placement of injection was performed without complication.\par

## 2023-05-16 NOTE — DISCUSSION/SUMMARY
[de-identified] : Progress note completed by JAMES Pate under the direct supervision of Doyle Matt M.D.\par

## 2023-05-23 ENCOUNTER — APPOINTMENT (OUTPATIENT)
Dept: ORTHOPEDIC SURGERY | Facility: CLINIC | Age: 83
End: 2023-05-23
Payer: MEDICARE

## 2023-05-23 VITALS — WEIGHT: 165 LBS | BODY MASS INDEX: 23.62 KG/M2 | HEIGHT: 70 IN

## 2023-05-23 PROCEDURE — 20611 DRAIN/INJ JOINT/BURSA W/US: CPT | Mod: RT

## 2023-05-23 NOTE — HISTORY OF PRESENT ILLNESS
[6] : 6 [de-identified] : 5/23/23: Here for R knee orthovisc #3.\par \par 5/16/23: Right knee orthovisc #2.\par \par 5/9/23: Here for right knee orthovisc #1. He states injections to shoulders last time helped his right but not left.\par \par 4/25/23: Here today for bilateral shoulders.  He has pain at night.  He takes aleve.  He feels pain deep in the shoulder, some down the arm.  \par \par 6/14/22: Right knee orthovisc #3.\par \par 6/7/22: Right knee orthovisc #2.\par \par 5/31/22: Here for R knee orthovisc #1.\par \par 3/15/22: Here for follow up, he had an episode of the R knee giving out underneath him. He is limping a little. He is in PT for balance.\par \par 11/23/21: here for b/l knee orthovisc #4.\par \par 11/9/21: here for b/l knee orthovisc #2.\par \par 11/2/21: Here for bilateral knee orthovoisc x 1.\par \par 7/6/21: Bilateral shoulder synvisc #3 today.\par \par 6/29/21: Bilateral shoulder synvisc #2 today.\par \par 6/22/21: Here for b/l shoulder synvisc today.\par \par 6/8/21: Here for follow up. His knees have improved with orthovisc. His right shoulder is more painful after lifting his wife (who passed recently). Pain is anterior.\par \par 3/30/21: Here for bilateral knee orthovisc #4\par \par 3/23/21 Orthovisc #3 bilateral knees.\par \par 3/16/21: Follow up b/l knees. Here for Orthovisc #2\par \par 3/9/21: Here today for b/l knee orthovisc #1.\par \par 3/2/21: Here today for b/l knees. R is worse than left. Prior rounds of visco injections had been giving him excellent relief.\par \par 5/5/2020- Here for right knee Synvisc #3\par \par 3/17/20: Here for R knee, recurring pain and symptoms to repeat visco series. Continues to benefit from visco series shoulders.\par \par 1/24/20: The right knee has become more painful. He had a lot of pain and popping recently.\par \par 9/10/19: Here for R knee orthovisc #4.\par \par 9/3/19: Here for R knee orthovisc #3.\par \par 8/27/19: Here for R knee orthovisc #2.\par \par 8/20/19: Here to start orthovisc series right knee.\par \par 7/9/19: Here for follow up. He had mild relief from the orthovisc in the knee. The shoulders feel great however.\par \par 2/19/19: Here for follow up, orthovisc #4 right knee.\par \par 2/12/19: Here for follow up, orthovisc #3 right knee.\par \par 2/5/19: Here for follow up, orthovisc #2 right knee. 1/29/19: Here to continue Orthovisc series, #4 today. 80% relief with injection so far. Able to sleep on his side now. Now with recurring R knee pain and symptoms. Pain getting up from a seated position and stiffness with motion. Did well with Orthovisc in the past. Requesting to repeat.\par \par 1/22/19: Here for follow up, orthovisc #3.\par \par 1/15/19: Here for follow up, orthovisc b/l shoulders #2. He notes relief on the R side.\par \par 1/8/19: Here for follow up. The injection last visit has worn off in December. He wants to consider the gel. He is also having left shoulder pain, mostly in the morning.\par \par 10/23/18: Here for follow up. He has been improved since the last injection, wore off a few weeks ago.\par \par 7/24/18: 78 y/o RHD male here for the R shoulder. He has had pain for 7-8 years. He has pain in the anterior shoulder, pain with activity. He has had cortisone injections, most recently more than 6 months ago. He also tried PT.\par \par MRI R shoulder: Full-thickness and partial tear of supraspinatus tendon. Low-grade partial tear of infraspinatus and high-grade partial tear of subscapularis tendons. Biceps tendon tear. Mild glenohumeral joint degenerative disease with superior migration. Moderate acromioclavicular joint degenerative disease with superior periarticular ganglion cyst formation. [FreeTextEntry1] : R knee

## 2023-05-23 NOTE — ASSESSMENT
[FreeTextEntry1] : R RTC arthropathy > L RTC arthropathy.\par Significant improvement in pain and motion with visco series.\par Prior B/L synvisc with relief.\par B/l SA injections 4/25/23.\par Request auth for orthovisc series. \par \par R > L knee OA.\par Prior orthovisc with relief.\par Bilateral knee CSI given 3/2/21\par R knee orthovisc completed 6/21/22.\par Orthovisc R knee #3 given.\par Rx for diclofenac gel.\par RTO 1 week.\par \par Procedure note:\par Viscosupplementation Injection: X-ray evidence of Osteoarthritis on this or prior visit and Patient has tried OTC's including aspirin, Ibuprofen, Aleve etc or prescription NSAIDs, and/or exercises at home and/ or physical therapy without satisfactory response. \par \par An injection of Orthovisc 2ml was injected into the right knee. after verbal consent using sterile technique. The risks, benefits, and alternatives to Viscosupplementation injection were explained in full to the patient. Risks outlined include but are not limited to infection, sepsis, bleeding, scarring, skin discoloration, temporary increase in pain, syncopal episode, failure to resolve symptoms, allergic reaction, and symptom recurrence. Signs and symptoms of infection reviewed and patient advised to call immediately for redness, fevers, and/or chills. Patient understood the risks. All questions were answered. After discussion of options, patient requested Viscosupplementation. Oral informed consent was obtained and sterile prep of the injection site was performed using alcohol. Sterile technique was utilized for the procedure including the preparation of the solutions used for the injection. Ethyl chloride spray was used topically.  Sterile technique used. Patient tolerated procedure well. Post Procedure Instructions: Patient was advised to call if redness, pain, or fever occur and apply ice for 15 min. out of every hour for the next 12-24 hours as tolerated. patient was advised to rest the joint(s) for 2 days.\par \par Ultrasound Guidance was used for the following reasons: for erosive arthritis. \par Ultrasound guided injection was performed of the knee, visualization of the needle and placement of injection was performed without complication.\par

## 2023-05-30 ENCOUNTER — APPOINTMENT (OUTPATIENT)
Dept: ORTHOPEDIC SURGERY | Facility: CLINIC | Age: 83
End: 2023-05-30
Payer: MEDICARE

## 2023-05-30 VITALS — WEIGHT: 165 LBS | HEIGHT: 70 IN | BODY MASS INDEX: 23.62 KG/M2

## 2023-05-30 PROCEDURE — 20611 DRAIN/INJ JOINT/BURSA W/US: CPT | Mod: LT

## 2023-05-30 PROCEDURE — 99213 OFFICE O/P EST LOW 20 MIN: CPT | Mod: 25

## 2023-05-30 NOTE — ASSESSMENT
[FreeTextEntry1] : R RTC arthropathy > L RTC arthropathy.\par Significant improvement in pain and motion with visco series.\par Prior B/L synvisc with relief.\par B/l SA injections 4/25/23.\par L shoulder orthovisc #1 given.\par RTO 1 week to continue series.\par \par R > L knee OA.\par Prior orthovisc with relief.\par Bilateral knee CSI given 3/2/21\par R knee orthovisc completed 6/21/22.\par Orthovisc R knee #4 given.\par Rx for diclofenac gel.\par RTO prn.\par \par Procedure note:\par Viscosupplementation Injection: X-ray evidence of Osteoarthritis on this or prior visit and Patient has tried OTC's including aspirin, Ibuprofen, Aleve etc or prescription NSAIDs, and/or exercises at home and/ or physical therapy without satisfactory response. \par \par An injection of Orthovisc 2ml was injected into the right knee. after verbal consent using sterile technique. The risks, benefits, and alternatives to Viscosupplementation injection were explained in full to the patient. Risks outlined include but are not limited to infection, sepsis, bleeding, scarring, skin discoloration, temporary increase in pain, syncopal episode, failure to resolve symptoms, allergic reaction, and symptom recurrence. Signs and symptoms of infection reviewed and patient advised to call immediately for redness, fevers, and/or chills. Patient understood the risks. All questions were answered. After discussion of options, patient requested Viscosupplementation. Oral informed consent was obtained and sterile prep of the injection site was performed using alcohol. Sterile technique was utilized for the procedure including the preparation of the solutions used for the injection. Ethyl chloride spray was used topically.  Sterile technique used. Patient tolerated procedure well. Post Procedure Instructions: Patient was advised to call if redness, pain, or fever occur and apply ice for 15 min. out of every hour for the next 12-24 hours as tolerated. patient was advised to rest the joint(s) for 2 days.\par \par Ultrasound Guidance was used for the following reasons: for erosive arthritis. \par Ultrasound guided injection was performed of the knee, visualization of the needle and placement of injection was performed without complication.\par \par Procedure note:\par Viscosupplementation Injection: X-ray evidence of Osteoarthritis on this or prior visit and Patient has tried OTC's including aspirin, Ibuprofen, Aleve etc or prescription NSAIDs, and/or exercises at home and/ or physical therapy without satisfactory response. \par \par An injection of Orthovisc 2ml was injected into the left shoulder. after verbal consent using sterile technique. The risks, benefits, and alternatives to Viscosupplementation injection were explained in full to the patient. Risks outlined include but are not limited to infection, sepsis, bleeding, scarring, skin discoloration, temporary increase in pain, syncopal episode, failure to resolve symptoms, allergic reaction, and symptom recurrence. Signs and symptoms of infection reviewed and patient advised to call immediately for redness, fevers, and/or chills. Patient understood the risks. All questions were answered. After discussion of options, patient requested Viscosupplementation. Oral informed consent was obtained and sterile prep of the injection site was performed using alcohol. Sterile technique was utilized for the procedure including the preparation of the solutions used for the injection. Ethyl chloride spray was used topically.  Sterile technique used. Patient tolerated procedure well. Post Procedure Instructions: Patient was advised to call if redness, pain, or fever occur and apply ice for 15 min. out of every hour for the next 12-24 hours as tolerated. patient was advised to rest the joint(s) for 2 days.\par \par Ultrasound Guidance was used for the following reasons: for Glenohumeral injection. \par Ultrasound guided injection was performed of the shoulder, visualization of the needle and placement of injection was performed without complication.\par

## 2023-05-30 NOTE — HISTORY OF PRESENT ILLNESS
[6] : 6 [de-identified] : 5/30/23: Here for R knee orthovisc #4. He was approved for left shoulder orthovisc and is here to start series.\par \par 5/23/23: Here for R knee orthovisc #3.\par \par 5/16/23: Right knee orthovisc #2.\par \par 5/9/23: Here for right knee orthovisc #1. He states injections to shoulders last time helped his right but not left.\par \par 4/25/23: Here today for bilateral shoulders.  He has pain at night.  He takes aleve.  He feels pain deep in the shoulder, some down the arm.  \par \par 6/14/22: Right knee orthovisc #3.\par \par 6/7/22: Right knee orthovisc #2.\par \par 5/31/22: Here for R knee orthovisc #1.\par \par 3/15/22: Here for follow up, he had an episode of the R knee giving out underneath him. He is limping a little. He is in PT for balance.\par \par 11/23/21: here for b/l knee orthovisc #4.\par \par 11/9/21: here for b/l knee orthovisc #2.\par \par 11/2/21: Here for bilateral knee orthovoisc x 1.\par \par 7/6/21: Bilateral shoulder synvisc #3 today.\par \par 6/29/21: Bilateral shoulder synvisc #2 today.\par \par 6/22/21: Here for b/l shoulder synvisc today.\par \par 6/8/21: Here for follow up. His knees have improved with orthovisc. His right shoulder is more painful after lifting his wife (who passed recently). Pain is anterior.\par \par 3/30/21: Here for bilateral knee orthovisc #4\par \par 3/23/21 Orthovisc #3 bilateral knees.\par \par 3/16/21: Follow up b/l knees. Here for Orthovisc #2\par \par 3/9/21: Here today for b/l knee orthovisc #1.\par \par 3/2/21: Here today for b/l knees. R is worse than left. Prior rounds of visco injections had been giving him excellent relief.\par \par 5/5/2020- Here for right knee Synvisc #3\par \par 3/17/20: Here for R knee, recurring pain and symptoms to repeat visco series. Continues to benefit from visco series shoulders.\par \par 1/24/20: The right knee has become more painful. He had a lot of pain and popping recently.\par \par 9/10/19: Here for R knee orthovisc #4.\par \par 9/3/19: Here for R knee orthovisc #3.\par \par 8/27/19: Here for R knee orthovisc #2.\par \par 8/20/19: Here to start orthovisc series right knee.\par \par 7/9/19: Here for follow up. He had mild relief from the orthovisc in the knee. The shoulders feel great however.\par \par 2/19/19: Here for follow up, orthovisc #4 right knee.\par \par 2/12/19: Here for follow up, orthovisc #3 right knee.\par \par 2/5/19: Here for follow up, orthovisc #2 right knee. 1/29/19: Here to continue Orthovisc series, #4 today. 80% relief with injection so far. Able to sleep on his side now. Now with recurring R knee pain and symptoms. Pain getting up from a seated position and stiffness with motion. Did well with Orthovisc in the past. Requesting to repeat.\par \par 1/22/19: Here for follow up, orthovisc #3.\par \par 1/15/19: Here for follow up, orthovisc b/l shoulders #2. He notes relief on the R side.\par \par 1/8/19: Here for follow up. The injection last visit has worn off in December. He wants to consider the gel. He is also having left shoulder pain, mostly in the morning.\par \par 10/23/18: Here for follow up. He has been improved since the last injection, wore off a few weeks ago.\par \par 7/24/18: 76 y/o RHD male here for the R shoulder. He has had pain for 7-8 years. He has pain in the anterior shoulder, pain with activity. He has had cortisone injections, most recently more than 6 months ago. He also tried PT.\par \par MRI R shoulder: Full-thickness and partial tear of supraspinatus tendon. Low-grade partial tear of infraspinatus and high-grade partial tear of subscapularis tendons. Biceps tendon tear. Mild glenohumeral joint degenerative disease with superior migration. Moderate acromioclavicular joint degenerative disease with superior periarticular ganglion cyst formation. [FreeTextEntry1] : R knee

## 2023-06-06 ENCOUNTER — APPOINTMENT (OUTPATIENT)
Dept: ORTHOPEDIC SURGERY | Facility: CLINIC | Age: 83
End: 2023-06-06
Payer: MEDICARE

## 2023-06-06 VITALS — HEIGHT: 70 IN | WEIGHT: 165 LBS | BODY MASS INDEX: 23.62 KG/M2

## 2023-06-06 PROCEDURE — 20611 DRAIN/INJ JOINT/BURSA W/US: CPT | Mod: LT

## 2023-06-06 NOTE — ASSESSMENT
[FreeTextEntry1] : R RTC arthropathy > L RTC arthropathy.\par Significant improvement in pain and motion with visco series.\par Prior B/L synvisc with relief.\par B/l SA injections 4/25/23.\par L shoulder orthovisc #2 given.\par RTO 1 week to continue series.\par \par R > L knee OA.\par Prior orthovisc with relief.\par Bilateral knee CSI given 3/2/21\par R knee orthovisc completed 5/30/23.\par Rx for diclofenac gel.\par RTO prn.\par \par Procedure note:\par Viscosupplementation Injection: X-ray evidence of Osteoarthritis on this or prior visit and Patient has tried OTC's including aspirin, Ibuprofen, Aleve etc or prescription NSAIDs, and/or exercises at home and/ or physical therapy without satisfactory response. \par \par An injection of Orthovisc 2ml was injected into the right knee. after verbal consent using sterile technique. The risks, benefits, and alternatives to Viscosupplementation injection were explained in full to the patient. Risks outlined include but are not limited to infection, sepsis, bleeding, scarring, skin discoloration, temporary increase in pain, syncopal episode, failure to resolve symptoms, allergic reaction, and symptom recurrence. Signs and symptoms of infection reviewed and patient advised to call immediately for redness, fevers, and/or chills. Patient understood the risks. All questions were answered. After discussion of options, patient requested Viscosupplementation. Oral informed consent was obtained and sterile prep of the injection site was performed using alcohol. Sterile technique was utilized for the procedure including the preparation of the solutions used for the injection. Ethyl chloride spray was used topically.  Sterile technique used. Patient tolerated procedure well. Post Procedure Instructions: Patient was advised to call if redness, pain, or fever occur and apply ice for 15 min. out of every hour for the next 12-24 hours as tolerated. patient was advised to rest the joint(s) for 2 days.\par \par Ultrasound Guidance was used for the following reasons: for erosive arthritis. \par Ultrasound guided injection was performed of the knee, visualization of the needle and placement of injection was performed without complication.\par \par Procedure note:\par Viscosupplementation Injection: X-ray evidence of Osteoarthritis on this or prior visit and Patient has tried OTC's including aspirin, Ibuprofen, Aleve etc or prescription NSAIDs, and/or exercises at home and/ or physical therapy without satisfactory response. \par \par An injection of Orthovisc 2ml was injected into the left shoulder. after verbal consent using sterile technique. The risks, benefits, and alternatives to Viscosupplementation injection were explained in full to the patient. Risks outlined include but are not limited to infection, sepsis, bleeding, scarring, skin discoloration, temporary increase in pain, syncopal episode, failure to resolve symptoms, allergic reaction, and symptom recurrence. Signs and symptoms of infection reviewed and patient advised to call immediately for redness, fevers, and/or chills. Patient understood the risks. All questions were answered. After discussion of options, patient requested Viscosupplementation. Oral informed consent was obtained and sterile prep of the injection site was performed using alcohol. Sterile technique was utilized for the procedure including the preparation of the solutions used for the injection. Ethyl chloride spray was used topically.  Sterile technique used. Patient tolerated procedure well. Post Procedure Instructions: Patient was advised to call if redness, pain, or fever occur and apply ice for 15 min. out of every hour for the next 12-24 hours as tolerated. patient was advised to rest the joint(s) for 2 days.\par \par Ultrasound Guidance was used for the following reasons: for Glenohumeral injection. \par Ultrasound guided injection was performed of the shoulder, visualization of the needle and placement of injection was performed without complication.\par

## 2023-06-06 NOTE — HISTORY OF PRESENT ILLNESS
[6] : 6 [de-identified] : 6/6/23: Here for L shoulder orthovisc #2.\par \par 5/30/23: Here for R knee orthovisc #4. He was approved for left shoulder orthovisc and is here to start series.\par \par 5/23/23: Here for R knee orthovisc #3.\par \par 5/16/23: Right knee orthovisc #2.\par \par 5/9/23: Here for right knee orthovisc #1. He states injections to shoulders last time helped his right but not left.\par \par 4/25/23: Here today for bilateral shoulders.  He has pain at night.  He takes aleve.  He feels pain deep in the shoulder, some down the arm.  \par \par 6/14/22: Right knee orthovisc #3.\par \par 6/7/22: Right knee orthovisc #2.\par \par 5/31/22: Here for R knee orthovisc #1.\par \par 3/15/22: Here for follow up, he had an episode of the R knee giving out underneath him. He is limping a little. He is in PT for balance.\par \par 11/23/21: here for b/l knee orthovisc #4.\par \par 11/9/21: here for b/l knee orthovisc #2.\par \par 11/2/21: Here for bilateral knee orthovoisc x 1.\par \par 7/6/21: Bilateral shoulder synvisc #3 today.\par \par 6/29/21: Bilateral shoulder synvisc #2 today.\par \par 6/22/21: Here for b/l shoulder synvisc today.\par \par 6/8/21: Here for follow up. His knees have improved with orthovisc. His right shoulder is more painful after lifting his wife (who passed recently). Pain is anterior.\par \par 3/30/21: Here for bilateral knee orthovisc #4\par \par 3/23/21 Orthovisc #3 bilateral knees.\par \par 3/16/21: Follow up b/l knees. Here for Orthovisc #2\par \par 3/9/21: Here today for b/l knee orthovisc #1.\par \par 3/2/21: Here today for b/l knees. R is worse than left. Prior rounds of visco injections had been giving him excellent relief.\par \par 5/5/2020- Here for right knee Synvisc #3\par \par 3/17/20: Here for R knee, recurring pain and symptoms to repeat visco series. Continues to benefit from visco series shoulders.\par \par 1/24/20: The right knee has become more painful. He had a lot of pain and popping recently.\par \par 9/10/19: Here for R knee orthovisc #4.\par \par 9/3/19: Here for R knee orthovisc #3.\par \par 8/27/19: Here for R knee orthovisc #2.\par \par 8/20/19: Here to start orthovisc series right knee.\par \par 7/9/19: Here for follow up. He had mild relief from the orthovisc in the knee. The shoulders feel great however.\par \par 2/19/19: Here for follow up, orthovisc #4 right knee.\par \par 2/12/19: Here for follow up, orthovisc #3 right knee.\par \par 2/5/19: Here for follow up, orthovisc #2 right knee. 1/29/19: Here to continue Orthovisc series, #4 today. 80% relief with injection so far. Able to sleep on his side now. Now with recurring R knee pain and symptoms. Pain getting up from a seated position and stiffness with motion. Did well with Orthovisc in the past. Requesting to repeat.\par \par 1/22/19: Here for follow up, orthovisc #3.\par \par 1/15/19: Here for follow up, orthovisc b/l shoulders #2. He notes relief on the R side.\par \par 1/8/19: Here for follow up. The injection last visit has worn off in December. He wants to consider the gel. He is also having left shoulder pain, mostly in the morning.\par \par 10/23/18: Here for follow up. He has been improved since the last injection, wore off a few weeks ago.\par \par 7/24/18: 76 y/o RHD male here for the R shoulder. He has had pain for 7-8 years. He has pain in the anterior shoulder, pain with activity. He has had cortisone injections, most recently more than 6 months ago. He also tried PT.\par \par MRI R shoulder: Full-thickness and partial tear of supraspinatus tendon. Low-grade partial tear of infraspinatus and high-grade partial tear of subscapularis tendons. Biceps tendon tear. Mild glenohumeral joint degenerative disease with superior migration. Moderate acromioclavicular joint degenerative disease with superior periarticular ganglion cyst formation. [FreeTextEntry1] : R knee

## 2023-06-13 ENCOUNTER — APPOINTMENT (OUTPATIENT)
Dept: ORTHOPEDIC SURGERY | Facility: CLINIC | Age: 83
End: 2023-06-13
Payer: MEDICARE

## 2023-06-13 VITALS — BODY MASS INDEX: 23.62 KG/M2 | WEIGHT: 165 LBS | HEIGHT: 70 IN

## 2023-06-13 PROCEDURE — 20611 DRAIN/INJ JOINT/BURSA W/US: CPT | Mod: LT

## 2023-06-13 NOTE — ASSESSMENT
[FreeTextEntry1] : R RTC arthropathy > L RTC arthropathy.\par Significant improvement in pain and motion with visco series.\par Prior B/L synvisc with relief.\par B/l SA injections 4/25/23.\par L shoulder orthovisc #3 given.\par RTO 1 week to continue series.\par \par R > L knee OA.\par Prior orthovisc with relief.\par Bilateral knee CSI given 3/2/21\par R knee orthovisc completed 5/30/23.\par Rx for diclofenac gel.\par RTO prn.\par \par Procedure note:\par Viscosupplementation Injection: X-ray evidence of Osteoarthritis on this or prior visit and Patient has tried OTC's including aspirin, Ibuprofen, Aleve etc or prescription NSAIDs, and/or exercises at home and/ or physical therapy without satisfactory response. \par \par An injection of Orthovisc 2ml was injected into the right knee. after verbal consent using sterile technique. The risks, benefits, and alternatives to Viscosupplementation injection were explained in full to the patient. Risks outlined include but are not limited to infection, sepsis, bleeding, scarring, skin discoloration, temporary increase in pain, syncopal episode, failure to resolve symptoms, allergic reaction, and symptom recurrence. Signs and symptoms of infection reviewed and patient advised to call immediately for redness, fevers, and/or chills. Patient understood the risks. All questions were answered. After discussion of options, patient requested Viscosupplementation. Oral informed consent was obtained and sterile prep of the injection site was performed using alcohol. Sterile technique was utilized for the procedure including the preparation of the solutions used for the injection. Ethyl chloride spray was used topically.  Sterile technique used. Patient tolerated procedure well. Post Procedure Instructions: Patient was advised to call if redness, pain, or fever occur and apply ice for 15 min. out of every hour for the next 12-24 hours as tolerated. patient was advised to rest the joint(s) for 2 days.\par \par Ultrasound Guidance was used for the following reasons: for erosive arthritis. \par Ultrasound guided injection was performed of the knee, visualization of the needle and placement of injection was performed without complication.\par \par Procedure note:\par Viscosupplementation Injection: X-ray evidence of Osteoarthritis on this or prior visit and Patient has tried OTC's including aspirin, Ibuprofen, Aleve etc or prescription NSAIDs, and/or exercises at home and/ or physical therapy without satisfactory response. \par \par An injection of Orthovisc 2ml was injected into the left shoulder. after verbal consent using sterile technique. The risks, benefits, and alternatives to Viscosupplementation injection were explained in full to the patient. Risks outlined include but are not limited to infection, sepsis, bleeding, scarring, skin discoloration, temporary increase in pain, syncopal episode, failure to resolve symptoms, allergic reaction, and symptom recurrence. Signs and symptoms of infection reviewed and patient advised to call immediately for redness, fevers, and/or chills. Patient understood the risks. All questions were answered. After discussion of options, patient requested Viscosupplementation. Oral informed consent was obtained and sterile prep of the injection site was performed using alcohol. Sterile technique was utilized for the procedure including the preparation of the solutions used for the injection. Ethyl chloride spray was used topically.  Sterile technique used. Patient tolerated procedure well. Post Procedure Instructions: Patient was advised to call if redness, pain, or fever occur and apply ice for 15 min. out of every hour for the next 12-24 hours as tolerated. patient was advised to rest the joint(s) for 2 days.\par \par Ultrasound Guidance was used for the following reasons: for Glenohumeral injection. \par Ultrasound guided injection was performed of the shoulder, visualization of the needle and placement of injection was performed without complication.\par

## 2023-06-13 NOTE — HISTORY OF PRESENT ILLNESS
[de-identified] : 6/13/23: Here for L shoulder orthovisc #3.\par \par 6/6/23: Here for L shoulder orthovisc #2.\par \par 5/30/23: Here for R knee orthovisc #4. He was approved for left shoulder orthovisc and is here to start series.\par \par 5/23/23: Here for R knee orthovisc #3.\par \par 5/16/23: Right knee orthovisc #2.\par \par 5/9/23: Here for right knee orthovisc #1. He states injections to shoulders last time helped his right but not left.\par \par 4/25/23: Here today for bilateral shoulders.  He has pain at night.  He takes aleve.  He feels pain deep in the shoulder, some down the arm.  \par \par 6/14/22: Right knee orthovisc #3.\par \par 6/7/22: Right knee orthovisc #2.\par \par 5/31/22: Here for R knee orthovisc #1.\par \par 3/15/22: Here for follow up, he had an episode of the R knee giving out underneath him. He is limping a little. He is in PT for balance.\par \par 11/23/21: here for b/l knee orthovisc #4.\par \par 11/9/21: here for b/l knee orthovisc #2.\par \par 11/2/21: Here for bilateral knee orthovoisc x 1.\par \par 7/6/21: Bilateral shoulder synvisc #3 today.\par \par 6/29/21: Bilateral shoulder synvisc #2 today.\par \par 6/22/21: Here for b/l shoulder synvisc today.\par \par 6/8/21: Here for follow up. His knees have improved with orthovisc. His right shoulder is more painful after lifting his wife (who passed recently). Pain is anterior.\par \par 3/30/21: Here for bilateral knee orthovisc #4\par \par 3/23/21 Orthovisc #3 bilateral knees.\par \par 3/16/21: Follow up b/l knees. Here for Orthovisc #2\par \par 3/9/21: Here today for b/l knee orthovisc #1.\par \par 3/2/21: Here today for b/l knees. R is worse than left. Prior rounds of visco injections had been giving him excellent relief.\par \par 5/5/2020- Here for right knee Synvisc #3\par \par 3/17/20: Here for R knee, recurring pain and symptoms to repeat visco series. Continues to benefit from visco series shoulders.\par \par 1/24/20: The right knee has become more painful. He had a lot of pain and popping recently.\par \par 9/10/19: Here for R knee orthovisc #4.\par \par 9/3/19: Here for R knee orthovisc #3.\par \par 8/27/19: Here for R knee orthovisc #2.\par \par 8/20/19: Here to start orthovisc series right knee.\par \par 7/9/19: Here for follow up. He had mild relief from the orthovisc in the knee. The shoulders feel great however.\par \par 2/19/19: Here for follow up, orthovisc #4 right knee.\par \par 2/12/19: Here for follow up, orthovisc #3 right knee.\par \par 2/5/19: Here for follow up, orthovisc #2 right knee. 1/29/19: Here to continue Orthovisc series, #4 today. 80% relief with injection so far. Able to sleep on his side now. Now with recurring R knee pain and symptoms. Pain getting up from a seated position and stiffness with motion. Did well with Orthovisc in the past. Requesting to repeat.\par \par 1/22/19: Here for follow up, orthovisc #3.\par \par 1/15/19: Here for follow up, orthovisc b/l shoulders #2. He notes relief on the R side.\par \par 1/8/19: Here for follow up. The injection last visit has worn off in December. He wants to consider the gel. He is also having left shoulder pain, mostly in the morning.\par \par 10/23/18: Here for follow up. He has been improved since the last injection, wore off a few weeks ago.\par \par 7/24/18: 76 y/o RHD male here for the R shoulder. He has had pain for 7-8 years. He has pain in the anterior shoulder, pain with activity. He has had cortisone injections, most recently more than 6 months ago. He also tried PT.\par \par MRI R shoulder: Full-thickness and partial tear of supraspinatus tendon. Low-grade partial tear of infraspinatus and high-grade partial tear of subscapularis tendons. Biceps tendon tear. Mild glenohumeral joint degenerative disease with superior migration. Moderate acromioclavicular joint degenerative disease with superior periarticular ganglion cyst formation.

## 2023-06-27 ENCOUNTER — APPOINTMENT (OUTPATIENT)
Dept: ORTHOPEDIC SURGERY | Facility: CLINIC | Age: 83
End: 2023-06-27
Payer: MEDICARE

## 2023-06-27 VITALS — WEIGHT: 165 LBS | BODY MASS INDEX: 23.62 KG/M2 | HEIGHT: 70 IN

## 2023-06-27 PROCEDURE — 20611 DRAIN/INJ JOINT/BURSA W/US: CPT | Mod: LT

## 2023-06-27 NOTE — ASSESSMENT
[FreeTextEntry1] : R RTC arthropathy > L RTC arthropathy.\par Significant improvement in pain and motion with visco series.\par Prior B/L synvisc with relief.\par B/l SA injections 4/25/23.\par L shoulder orthovisc #4 given.\par RTO prn. \par \par R > L knee OA.\par Prior orthovisc with relief.\par Bilateral knee CSI given 3/2/21\par R knee orthovisc completed 5/30/23.\par Rx for diclofenac gel.\par RTO prn.\par \par Procedure note:\par Viscosupplementation Injection: X-ray evidence of Osteoarthritis on this or prior visit and Patient has tried OTC's including aspirin, Ibuprofen, Aleve etc or prescription NSAIDs, and/or exercises at home and/ or physical therapy without satisfactory response. \par \par An injection of Orthovisc 2ml was injected into the right knee. after verbal consent using sterile technique. The risks, benefits, and alternatives to Viscosupplementation injection were explained in full to the patient. Risks outlined include but are not limited to infection, sepsis, bleeding, scarring, skin discoloration, temporary increase in pain, syncopal episode, failure to resolve symptoms, allergic reaction, and symptom recurrence. Signs and symptoms of infection reviewed and patient advised to call immediately for redness, fevers, and/or chills. Patient understood the risks. All questions were answered. After discussion of options, patient requested Viscosupplementation. Oral informed consent was obtained and sterile prep of the injection site was performed using alcohol. Sterile technique was utilized for the procedure including the preparation of the solutions used for the injection. Ethyl chloride spray was used topically.  Sterile technique used. Patient tolerated procedure well. Post Procedure Instructions: Patient was advised to call if redness, pain, or fever occur and apply ice for 15 min. out of every hour for the next 12-24 hours as tolerated. patient was advised to rest the joint(s) for 2 days.\par \par Ultrasound Guidance was used for the following reasons: for erosive arthritis. \par Ultrasound guided injection was performed of the knee, visualization of the needle and placement of injection was performed without complication.\par \par Procedure note:\par Viscosupplementation Injection: X-ray evidence of Osteoarthritis on this or prior visit and Patient has tried OTC's including aspirin, Ibuprofen, Aleve etc or prescription NSAIDs, and/or exercises at home and/ or physical therapy without satisfactory response. \par \par An injection of Orthovisc 2ml was injected into the left shoulder. after verbal consent using sterile technique. The risks, benefits, and alternatives to Viscosupplementation injection were explained in full to the patient. Risks outlined include but are not limited to infection, sepsis, bleeding, scarring, skin discoloration, temporary increase in pain, syncopal episode, failure to resolve symptoms, allergic reaction, and symptom recurrence. Signs and symptoms of infection reviewed and patient advised to call immediately for redness, fevers, and/or chills. Patient understood the risks. All questions were answered. After discussion of options, patient requested Viscosupplementation. Oral informed consent was obtained and sterile prep of the injection site was performed using alcohol. Sterile technique was utilized for the procedure including the preparation of the solutions used for the injection. Ethyl chloride spray was used topically.  Sterile technique used. Patient tolerated procedure well. Post Procedure Instructions: Patient was advised to call if redness, pain, or fever occur and apply ice for 15 min. out of every hour for the next 12-24 hours as tolerated. patient was advised to rest the joint(s) for 2 days.\par \par Ultrasound Guidance was used for the following reasons: for Glenohumeral injection. \par Ultrasound guided injection was performed of the shoulder, visualization of the needle and placement of injection was performed without complication.\par

## 2023-06-27 NOTE — HISTORY OF PRESENT ILLNESS
[de-identified] : 6/27/23: Here for L shoulder orthovisc 4.\par \par 6/13/23: Here for L shoulder orthovisc #3.\par \par 6/6/23: Here for L shoulder orthovisc #2.\par \par 5/30/23: Here for R knee orthovisc #4. He was approved for left shoulder orthovisc and is here to start series.\par \par 5/23/23: Here for R knee orthovisc #3.\par \par 5/16/23: Right knee orthovisc #2.\par \par 5/9/23: Here for right knee orthovisc #1. He states injections to shoulders last time helped his right but not left.\par \par 4/25/23: Here today for bilateral shoulders.  He has pain at night.  He takes aleve.  He feels pain deep in the shoulder, some down the arm.  \par \par 6/14/22: Right knee orthovisc #3.\par \par 6/7/22: Right knee orthovisc #2.\par \par 5/31/22: Here for R knee orthovisc #1.\par \par 3/15/22: Here for follow up, he had an episode of the R knee giving out underneath him. He is limping a little. He is in PT for balance.\par \par 11/23/21: here for b/l knee orthovisc #4.\par \par 11/9/21: here for b/l knee orthovisc #2.\par \par 11/2/21: Here for bilateral knee orthovoisc x 1.\par \par 7/6/21: Bilateral shoulder synvisc #3 today.\par \par 6/29/21: Bilateral shoulder synvisc #2 today.\par \par 6/22/21: Here for b/l shoulder synvisc today.\par \par 6/8/21: Here for follow up. His knees have improved with orthovisc. His right shoulder is more painful after lifting his wife (who passed recently). Pain is anterior.\par \par 3/30/21: Here for bilateral knee orthovisc #4\par \par 3/23/21 Orthovisc #3 bilateral knees.\par \par 3/16/21: Follow up b/l knees. Here for Orthovisc #2\par \par 3/9/21: Here today for b/l knee orthovisc #1.\par \par 3/2/21: Here today for b/l knees. R is worse than left. Prior rounds of visco injections had been giving him excellent relief.\par \par 5/5/2020- Here for right knee Synvisc #3\par \par 3/17/20: Here for R knee, recurring pain and symptoms to repeat visco series. Continues to benefit from visco series shoulders.\par \par 1/24/20: The right knee has become more painful. He had a lot of pain and popping recently.\par \par 9/10/19: Here for R knee orthovisc #4.\par \par 9/3/19: Here for R knee orthovisc #3.\par \par 8/27/19: Here for R knee orthovisc #2.\par \par 8/20/19: Here to start orthovisc series right knee.\par \par 7/9/19: Here for follow up. He had mild relief from the orthovisc in the knee. The shoulders feel great however.\par \par 2/19/19: Here for follow up, orthovisc #4 right knee.\par \par 2/12/19: Here for follow up, orthovisc #3 right knee.\par \par 2/5/19: Here for follow up, orthovisc #2 right knee. 1/29/19: Here to continue Orthovisc series, #4 today. 80% relief with injection so far. Able to sleep on his side now. Now with recurring R knee pain and symptoms. Pain getting up from a seated position and stiffness with motion. Did well with Orthovisc in the past. Requesting to repeat.\par \par 1/22/19: Here for follow up, orthovisc #3.\par \par 1/15/19: Here for follow up, orthovisc b/l shoulders #2. He notes relief on the R side.\par \par 1/8/19: Here for follow up. The injection last visit has worn off in December. He wants to consider the gel. He is also having left shoulder pain, mostly in the morning.\par \par 10/23/18: Here for follow up. He has been improved since the last injection, wore off a few weeks ago.\par \par 7/24/18: 76 y/o RHD male here for the R shoulder. He has had pain for 7-8 years. He has pain in the anterior shoulder, pain with activity. He has had cortisone injections, most recently more than 6 months ago. He also tried PT.\par \par MRI R shoulder: Full-thickness and partial tear of supraspinatus tendon. Low-grade partial tear of infraspinatus and high-grade partial tear of subscapularis tendons. Biceps tendon tear. Mild glenohumeral joint degenerative disease with superior migration. Moderate acromioclavicular joint degenerative disease with superior periarticular ganglion cyst formation.

## 2023-08-16 NOTE — PHYSICAL EXAM
[Bilateral] : shoulder bilaterally [5 ___] : forward flexion 5[unfilled]/5 [5___] : external rotation 5[unfilled]/5 [] : positive Mary Carmen [FreeTextEntry9] : FE: 120 b/l - Ordering, reviewing, and interpreting labs, testing, and imaging.  - Independently obtaining a review of systems and performing a physical exam  - Counselling and educating patient regarding interpretation of aforementioned items and plan of care.

## 2023-11-30 ENCOUNTER — APPOINTMENT (OUTPATIENT)
Dept: ORTHOPEDIC SURGERY | Facility: CLINIC | Age: 83
End: 2023-11-30
Payer: MEDICARE

## 2023-11-30 VITALS — BODY MASS INDEX: 23.62 KG/M2 | WEIGHT: 165 LBS | HEIGHT: 70 IN

## 2023-11-30 PROCEDURE — 99213 OFFICE O/P EST LOW 20 MIN: CPT | Mod: 25

## 2023-11-30 PROCEDURE — J3490M: CUSTOM

## 2023-11-30 PROCEDURE — 20610 DRAIN/INJ JOINT/BURSA W/O US: CPT | Mod: 50

## 2023-11-30 PROCEDURE — 73562 X-RAY EXAM OF KNEE 3: CPT | Mod: 50

## 2023-12-07 ENCOUNTER — APPOINTMENT (OUTPATIENT)
Dept: ORTHOPEDIC SURGERY | Facility: CLINIC | Age: 83
End: 2023-12-07
Payer: MEDICARE

## 2023-12-07 VITALS — WEIGHT: 165 LBS | BODY MASS INDEX: 23.62 KG/M2 | HEIGHT: 70 IN

## 2023-12-07 PROCEDURE — 20610 DRAIN/INJ JOINT/BURSA W/O US: CPT | Mod: 50

## 2023-12-12 ENCOUNTER — APPOINTMENT (OUTPATIENT)
Dept: ORTHOPEDIC SURGERY | Facility: CLINIC | Age: 83
End: 2023-12-12
Payer: MEDICARE

## 2023-12-12 VITALS — HEIGHT: 70 IN | WEIGHT: 165 LBS | BODY MASS INDEX: 23.62 KG/M2

## 2023-12-12 PROCEDURE — 20610 DRAIN/INJ JOINT/BURSA W/O US: CPT | Mod: 50

## 2023-12-19 ENCOUNTER — APPOINTMENT (OUTPATIENT)
Dept: ORTHOPEDIC SURGERY | Facility: CLINIC | Age: 83
End: 2023-12-19
Payer: MEDICARE

## 2023-12-19 VITALS — WEIGHT: 165 LBS | BODY MASS INDEX: 23.62 KG/M2 | HEIGHT: 70 IN

## 2023-12-19 PROCEDURE — 20610 DRAIN/INJ JOINT/BURSA W/O US: CPT | Mod: 50

## 2023-12-19 NOTE — HISTORY OF PRESENT ILLNESS
[de-identified] : 12/19/23: Here for b/l knee orthovisc #3.  12/12/2023: Here for bilateral knee Orthovisc #2.  11/30/23: Here for follow up.  He is having pain in both knees today.  He is in PT for balance but is having pain thats affecting his ability to improve in PT.  Right knee is lateral, left is medial.   6/27/23: Here for L shoulder orthovisc 4.  6/13/23: Here for L shoulder orthovisc #3.  6/6/23: Here for L shoulder orthovisc #2.  5/30/23: Here for R knee orthovisc #4. He was approved for left shoulder orthovisc and is here to start series.  5/23/23: Here for R knee orthovisc #3.  5/16/23: Right knee orthovisc #2.  5/9/23: Here for right knee orthovisc #1. He states injections to shoulders last time helped his right but not left.  4/25/23: Here today for bilateral shoulders.  He has pain at night.  He takes aleve.  He feels pain deep in the shoulder, some down the arm.    6/14/22: Right knee orthovisc #3.  6/7/22: Right knee orthovisc #2.  5/31/22: Here for R knee orthovisc #1.  3/15/22: Here for follow up, he had an episode of the R knee giving out underneath him. He is limping a little. He is in PT for balance.  11/23/21: here for b/l knee orthovisc #4.  11/9/21: here for b/l knee orthovisc #2.  11/2/21: Here for bilateral knee orthovoisc x 1.  7/6/21: Bilateral shoulder synvisc #3 today.  6/29/21: Bilateral shoulder synvisc #2 today.  6/22/21: Here for b/l shoulder synvisc today.  6/8/21: Here for follow up. His knees have improved with orthovisc. His right shoulder is more painful after lifting his wife (who passed recently). Pain is anterior.  3/30/21: Here for bilateral knee orthovisc #4  3/23/21 Orthovisc #3 bilateral knees.  3/16/21: Follow up b/l knees. Here for Orthovisc #2  3/9/21: Here today for b/l knee orthovisc #1.  3/2/21: Here today for b/l knees. R is worse than left. Prior rounds of visco injections had been giving him excellent relief.  5/5/2020- Here for right knee Synvisc #3  3/17/20: Here for R knee, recurring pain and symptoms to repeat visco series. Continues to benefit from visco series shoulders.  1/24/20: The right knee has become more painful. He had a lot of pain and popping recently.  9/10/19: Here for R knee orthovisc #4.  9/3/19: Here for R knee orthovisc #3.  8/27/19: Here for R knee orthovisc #2.  8/20/19: Here to start orthovisc series right knee.  7/9/19: Here for follow up. He had mild relief from the orthovisc in the knee. The shoulders feel great however.  2/19/19: Here for follow up, orthovisc #4 right knee.  2/12/19: Here for follow up, orthovisc #3 right knee.  2/5/19: Here for follow up, orthovisc #2 right knee. 1/29/19: Here to continue Orthovisc series, #4 today. 80% relief with injection so far. Able to sleep on his side now. Now with recurring R knee pain and symptoms. Pain getting up from a seated position and stiffness with motion. Did well with Orthovisc in the past. Requesting to repeat.  1/22/19: Here for follow up, orthovisc #3.  1/15/19: Here for follow up, orthovisc b/l shoulders #2. He notes relief on the R side.  1/8/19: Here for follow up. The injection last visit has worn off in December. He wants to consider the gel. He is also having left shoulder pain, mostly in the morning.  10/23/18: Here for follow up. He has been improved since the last injection, wore off a few weeks ago.  7/24/18: 76 y/o RHD male here for the R shoulder. He has had pain for 7-8 years. He has pain in the anterior shoulder, pain with activity. He has had cortisone injections, most recently more than 6 months ago. He also tried PT.  MRI R shoulder: Full-thickness and partial tear of supraspinatus tendon. Low-grade partial tear of infraspinatus and high-grade partial tear of subscapularis tendons. Biceps tendon tear. Mild glenohumeral joint degenerative disease with superior migration. Moderate acromioclavicular joint degenerative disease with superior periarticular ganglion cyst formation. [FreeTextEntry1] : knees

## 2023-12-19 NOTE — ASSESSMENT
[FreeTextEntry1] : Bilateral knee DJD. Prior orthovisc with improvement. Bilateral knee CSI 11/30/23. B/L knee orthovisc #3 given. RTO 1 week to continue.  Procedure note: Viscosupplementation Injection: X-ray evidence of Osteoarthritis on this or prior visit and Patient has tried OTC's including aspirin, Ibuprofen, Aleve etc or prescription NSAIDs, and/or exercises at home and/ or physical therapy without satisfactory response.   An injection of Orthovisc 2ml was injected into the left and right knees. after verbal consent using sterile technique. The risks, benefits, and alternatives to Viscosupplementation injection were explained in full to the patient. Risks outlined include but are not limited to infection, sepsis, bleeding, scarring, skin discoloration, temporary increase in pain, syncopal episode, failure to resolve symptoms, allergic reaction, and symptom recurrence. Signs and symptoms of infection reviewed and patient advised to call immediately for redness, fevers, and/or chills. Patient understood the risks. All questions were answered. After discussion of options, patient requested Viscosupplementation. Oral informed consent was obtained and sterile prep of the injection site was performed using alcohol. Sterile technique was utilized for the procedure including the preparation of the solutions used for the injection. Ethyl chloride spray was used topically.  Sterile technique used. Patient tolerated procedure well. Post Procedure Instructions: Patient was advised to call if redness, pain, or fever occur and apply ice for 15 min. out of every hour for the next 12-24 hours as tolerated. patient was advised to rest the joint(s) for 2 days.

## 2024-01-02 ENCOUNTER — APPOINTMENT (OUTPATIENT)
Dept: ORTHOPEDIC SURGERY | Facility: CLINIC | Age: 84
End: 2024-01-02
Payer: MEDICARE

## 2024-01-02 PROCEDURE — 20610 DRAIN/INJ JOINT/BURSA W/O US: CPT | Mod: 50

## 2024-01-02 NOTE — ASSESSMENT
[FreeTextEntry1] : Bilateral knee DJD. Prior orthovisc with improvement. Bilateral knee CSI 11/30/23. B/L knee orthovisc #4 given. RTO prn.  Procedure note: Viscosupplementation Injection: X-ray evidence of Osteoarthritis on this or prior visit and Patient has tried OTC's including aspirin, Ibuprofen, Aleve etc or prescription NSAIDs, and/or exercises at home and/ or physical therapy without satisfactory response.   An injection of Orthovisc 2ml was injected into the left and right knees. after verbal consent using sterile technique. The risks, benefits, and alternatives to Viscosupplementation injection were explained in full to the patient. Risks outlined include but are not limited to infection, sepsis, bleeding, scarring, skin discoloration, temporary increase in pain, syncopal episode, failure to resolve symptoms, allergic reaction, and symptom recurrence. Signs and symptoms of infection reviewed and patient advised to call immediately for redness, fevers, and/or chills. Patient understood the risks. All questions were answered. After discussion of options, patient requested Viscosupplementation. Oral informed consent was obtained and sterile prep of the injection site was performed using alcohol. Sterile technique was utilized for the procedure including the preparation of the solutions used for the injection. Ethyl chloride spray was used topically.  Sterile technique used. Patient tolerated procedure well. Post Procedure Instructions: Patient was advised to call if redness, pain, or fever occur and apply ice for 15 min. out of every hour for the next 12-24 hours as tolerated. patient was advised to rest the joint(s) for 2 days.

## 2024-01-02 NOTE — HISTORY OF PRESENT ILLNESS
[de-identified] : 01/02/24: Here for B/L knee orthovisc #3.   12/12/2023: Here for bilateral knee Orthovisc #2.  11/30/23: Here for follow up.  He is having pain in both knees today.  He is in PT for balance but is having pain thats affecting his ability to improve in PT.  Right knee is lateral, left is medial.   6/27/23: Here for L shoulder orthovisc 4.  6/13/23: Here for L shoulder orthovisc #3.  6/6/23: Here for L shoulder orthovisc #2.  5/30/23: Here for R knee orthovisc #4. He was approved for left shoulder orthovisc and is here to start series.  5/23/23: Here for R knee orthovisc #3.  5/16/23: Right knee orthovisc #2.  5/9/23: Here for right knee orthovisc #1. He states injections to shoulders last time helped his right but not left.  4/25/23: Here today for bilateral shoulders.  He has pain at night.  He takes aleve.  He feels pain deep in the shoulder, some down the arm.    6/14/22: Right knee orthovisc #3.  6/7/22: Right knee orthovisc #2.  5/31/22: Here for R knee orthovisc #1.  3/15/22: Here for follow up, he had an episode of the R knee giving out underneath him. He is limping a little. He is in PT for balance.  11/23/21: here for b/l knee orthovisc #4.  11/9/21: here for b/l knee orthovisc #2.  11/2/21: Here for bilateral knee orthovoisc x 1.  7/6/21: Bilateral shoulder synvisc #3 today.  6/29/21: Bilateral shoulder synvisc #2 today.  6/22/21: Here for b/l shoulder synvisc today.  6/8/21: Here for follow up. His knees have improved with orthovisc. His right shoulder is more painful after lifting his wife (who passed recently). Pain is anterior.  3/30/21: Here for bilateral knee orthovisc #4  3/23/21 Orthovisc #3 bilateral knees.  3/16/21: Follow up b/l knees. Here for Orthovisc #2  3/9/21: Here today for b/l knee orthovisc #1.  3/2/21: Here today for b/l knees. R is worse than left. Prior rounds of visco injections had been giving him excellent relief.  5/5/2020- Here for right knee Synvisc #3  3/17/20: Here for R knee, recurring pain and symptoms to repeat visco series. Continues to benefit from visco series shoulders.  1/24/20: The right knee has become more painful. He had a lot of pain and popping recently.  9/10/19: Here for R knee orthovisc #4.  9/3/19: Here for R knee orthovisc #3.  8/27/19: Here for R knee orthovisc #2.  8/20/19: Here to start orthovisc series right knee.  7/9/19: Here for follow up. He had mild relief from the orthovisc in the knee. The shoulders feel great however.  2/19/19: Here for follow up, orthovisc #4 right knee.  2/12/19: Here for follow up, orthovisc #3 right knee.  2/5/19: Here for follow up, orthovisc #2 right knee. 1/29/19: Here to continue Orthovisc series, #4 today. 80% relief with injection so far. Able to sleep on his side now. Now with recurring R knee pain and symptoms. Pain getting up from a seated position and stiffness with motion. Did well with Orthovisc in the past. Requesting to repeat.  1/22/19: Here for follow up, orthovisc #3.  1/15/19: Here for follow up, orthovisc b/l shoulders #2. He notes relief on the R side.  1/8/19: Here for follow up. The injection last visit has worn off in December. He wants to consider the gel. He is also having left shoulder pain, mostly in the morning.  10/23/18: Here for follow up. He has been improved since the last injection, wore off a few weeks ago.  7/24/18: 76 y/o RHD male here for the R shoulder. He has had pain for 7-8 years. He has pain in the anterior shoulder, pain with activity. He has had cortisone injections, most recently more than 6 months ago. He also tried PT.  MRI R shoulder: Full-thickness and partial tear of supraspinatus tendon. Low-grade partial tear of infraspinatus and high-grade partial tear of subscapularis tendons. Biceps tendon tear. Mild glenohumeral joint degenerative disease with superior migration. Moderate acromioclavicular joint degenerative disease with superior periarticular ganglion cyst formation. [FreeTextEntry1] : knees

## 2024-01-16 ENCOUNTER — APPOINTMENT (OUTPATIENT)
Dept: ORTHOPEDIC SURGERY | Facility: CLINIC | Age: 84
End: 2024-01-16
Payer: MEDICARE

## 2024-01-16 VITALS — WEIGHT: 165 LBS | HEIGHT: 70 IN | BODY MASS INDEX: 23.62 KG/M2

## 2024-01-16 PROCEDURE — 20611 DRAIN/INJ JOINT/BURSA W/US: CPT | Mod: 50

## 2024-01-16 NOTE — HISTORY OF PRESENT ILLNESS
[de-identified] : 1/16/24:  Here for follow up.  He got excellent improvement from the orthovisc for the knees.  He is having pain in the R. L shoulders.  He has pain at night, awaking him from sleep.   01/02/24: Here for B/L knee orthovisc #3.   12/12/2023: Here for bilateral knee Orthovisc #2.  11/30/23: Here for follow up.  He is having pain in both knees today.  He is in PT for balance but is having pain thats affecting his ability to improve in PT.  Right knee is lateral, left is medial.   6/27/23: Here for L shoulder orthovisc 4.  6/13/23: Here for L shoulder orthovisc #3.  6/6/23: Here for L shoulder orthovisc #2.  5/30/23: Here for R knee orthovisc #4. He was approved for left shoulder orthovisc and is here to start series.  5/23/23: Here for R knee orthovisc #3.  5/16/23: Right knee orthovisc #2.  5/9/23: Here for right knee orthovisc #1. He states injections to shoulders last time helped his right but not left.  4/25/23: Here today for bilateral shoulders.  He has pain at night.  He takes aleve.  He feels pain deep in the shoulder, some down the arm.    6/14/22: Right knee orthovisc #3.  6/7/22: Right knee orthovisc #2.  5/31/22: Here for R knee orthovisc #1.  3/15/22: Here for follow up, he had an episode of the R knee giving out underneath him. He is limping a little. He is in PT for balance.  11/23/21: here for b/l knee orthovisc #4.  11/9/21: here for b/l knee orthovisc #2.  11/2/21: Here for bilateral knee orthovoisc x 1.  7/6/21: Bilateral shoulder synvisc #3 today.  6/29/21: Bilateral shoulder synvisc #2 today.  6/22/21: Here for b/l shoulder synvisc today.  6/8/21: Here for follow up. His knees have improved with orthovisc. His right shoulder is more painful after lifting his wife (who passed recently). Pain is anterior.  3/30/21: Here for bilateral knee orthovisc #4  3/23/21 Orthovisc #3 bilateral knees.  3/16/21: Follow up b/l knees. Here for Orthovisc #2  3/9/21: Here today for b/l knee orthovisc #1.  3/2/21: Here today for b/l knees. R is worse than left. Prior rounds of visco injections had been giving him excellent relief.  5/5/2020- Here for right knee Synvisc #3  3/17/20: Here for R knee, recurring pain and symptoms to repeat visco series. Continues to benefit from visco series shoulders.  1/24/20: The right knee has become more painful. He had a lot of pain and popping recently.  9/10/19: Here for R knee orthovisc #4.  9/3/19: Here for R knee orthovisc #3.  8/27/19: Here for R knee orthovisc #2.  8/20/19: Here to start orthovisc series right knee.  7/9/19: Here for follow up. He had mild relief from the orthovisc in the knee. The shoulders feel great however.  2/19/19: Here for follow up, orthovisc #4 right knee.  2/12/19: Here for follow up, orthovisc #3 right knee.  2/5/19: Here for follow up, orthovisc #2 right knee. 1/29/19: Here to continue Orthovisc series, #4 today. 80% relief with injection so far. Able to sleep on his side now. Now with recurring R knee pain and symptoms. Pain getting up from a seated position and stiffness with motion. Did well with Orthovisc in the past. Requesting to repeat.  1/22/19: Here for follow up, orthovisc #3.  1/15/19: Here for follow up, orthovisc b/l shoulders #2. He notes relief on the R side.  1/8/19: Here for follow up. The injection last visit has worn off in December. He wants to consider the gel. He is also having left shoulder pain, mostly in the morning.  10/23/18: Here for follow up. He has been improved since the last injection, wore off a few weeks ago.  7/24/18: 76 y/o RHD male here for the R shoulder. He has had pain for 7-8 years. He has pain in the anterior shoulder, pain with activity. He has had cortisone injections, most recently more than 6 months ago. He also tried PT.  MRI R shoulder: Full-thickness and partial tear of supraspinatus tendon. Low-grade partial tear of infraspinatus and high-grade partial tear of subscapularis tendons. Biceps tendon tear. Mild glenohumeral joint degenerative disease with superior migration. Moderate acromioclavicular joint degenerative disease with superior periarticular ganglion cyst formation. [FreeTextEntry1] : knees

## 2024-01-16 NOTE — ASSESSMENT
[FreeTextEntry1] : Bilateral RCT arthropathy.  Bilateral knee DJD. Prior orthovisc with improvement. Bilateral knee CSI 11/30/23. B/L knee orthovisc 1/2/14/23 with improvement. B/l shoulder orthovisc #1 today. RTO.  Procedure Note: Viscosupplementation Injection: X-ray evidence of Osteoarthritis on this or prior visit and Patient has tried OTC's including aspirin, Ibuprofen, Aleve etc or prescription NSAIDs, and/or exercises at home and/ or physical therapy without satisfactory response.   An injection of Orthovisc 2ml was injected into the right and left shoulders. The risks, benefits, and alternatives to Viscosupplementation injection were explained in full to the patient. Risks outlined include but are not limited to infection, sepsis, bleeding, scarring, skin discoloration, temporary increase in pain, syncopal episode, failure to resolve symptoms, allergic reaction, and symptom recurrence. Signs and symptoms of infection reviewed and patient advised to call immediately for redness, fevers, and/or chills. Patient understood the risks. All questions were answered. After discussion of options, patient requested Viscosupplementation. Oral informed consent was obtained and sterile prep of the injection site was performed using alcohol. Sterile technique was utilized for the procedure including the preparation of the solutions used for the injection. Ethyl chloride spray was used topically.  Sterile technique used. Patient tolerated procedure well. Post Procedure Instructions: Patient was advised to call if redness, pain, or fever occur and apply ice for 15 min. out of every hour for the next 12-24 hours as tolerated. patient was advised to rest the joint(s) for 2 days.  Ultrasound Guidance was used for the following reasons: for Glenohumeral injection.  Ultrasound guided injection was performed of the shoulder, visualization of the needle and placement of injection was performed without complication.

## 2024-01-16 NOTE — PHYSICAL EXAM
[Bilateral] : shoulder bilaterally [] : pain with external rotation [FreeTextEntry9] : FE: R 90, L 100

## 2024-01-23 ENCOUNTER — APPOINTMENT (OUTPATIENT)
Dept: ORTHOPEDIC SURGERY | Facility: CLINIC | Age: 84
End: 2024-01-23
Payer: MEDICARE

## 2024-01-23 VITALS — BODY MASS INDEX: 23.62 KG/M2 | HEIGHT: 70 IN | WEIGHT: 165 LBS

## 2024-01-23 PROCEDURE — 20611 DRAIN/INJ JOINT/BURSA W/US: CPT | Mod: 50

## 2024-01-23 NOTE — PHYSICAL EXAM
[Bilateral] : shoulder bilaterally [] : pain with external rotation [FreeTextEntry9] : FE: R 90, L 100 normal

## 2024-01-23 NOTE — HISTORY OF PRESENT ILLNESS
[de-identified] :  01/23/2024: for continued b/l shoulder ov #2  1/16/24:  Here for follow up.  He got excellent improvement from the orthovisc for the knees.  He is having pain in the R. L shoulders.  He has pain at night, awaking him from sleep.   01/02/24: Here for B/L knee orthovisc #3.   12/12/2023: Here for bilateral knee Orthovisc #2.  11/30/23: Here for follow up.  He is having pain in both knees today.  He is in PT for balance but is having pain thats affecting his ability to improve in PT.  Right knee is lateral, left is medial.   6/27/23: Here for L shoulder orthovisc 4.  6/13/23: Here for L shoulder orthovisc #3.  6/6/23: Here for L shoulder orthovisc #2.  5/30/23: Here for R knee orthovisc #4. He was approved for left shoulder orthovisc and is here to start series.  5/23/23: Here for R knee orthovisc #3.  5/16/23: Right knee orthovisc #2.  5/9/23: Here for right knee orthovisc #1. He states injections to shoulders last time helped his right but not left.  4/25/23: Here today for bilateral shoulders.  He has pain at night.  He takes aleve.  He feels pain deep in the shoulder, some down the arm.    6/14/22: Right knee orthovisc #3.  6/7/22: Right knee orthovisc #2.  5/31/22: Here for R knee orthovisc #1.  3/15/22: Here for follow up, he had an episode of the R knee giving out underneath him. He is limping a little. He is in PT for balance.  11/23/21: here for b/l knee orthovisc #4.  11/9/21: here for b/l knee orthovisc #2.  11/2/21: Here for bilateral knee orthovoisc x 1.  7/6/21: Bilateral shoulder synvisc #3 today.  6/29/21: Bilateral shoulder synvisc #2 today.  6/22/21: Here for b/l shoulder synvisc today.  6/8/21: Here for follow up. His knees have improved with orthovisc. His right shoulder is more painful after lifting his wife (who passed recently). Pain is anterior.  3/30/21: Here for bilateral knee orthovisc #4  3/23/21 Orthovisc #3 bilateral knees.  3/16/21: Follow up b/l knees. Here for Orthovisc #2  3/9/21: Here today for b/l knee orthovisc #1.  3/2/21: Here today for b/l knees. R is worse than left. Prior rounds of visco injections had been giving him excellent relief.  5/5/2020- Here for right knee Synvisc #3  3/17/20: Here for R knee, recurring pain and symptoms to repeat visco series. Continues to benefit from visco series shoulders.  1/24/20: The right knee has become more painful. He had a lot of pain and popping recently.  9/10/19: Here for R knee orthovisc #4.  9/3/19: Here for R knee orthovisc #3.  8/27/19: Here for R knee orthovisc #2.  8/20/19: Here to start orthovisc series right knee.  7/9/19: Here for follow up. He had mild relief from the orthovisc in the knee. The shoulders feel great however.  2/19/19: Here for follow up, orthovisc #4 right knee.  2/12/19: Here for follow up, orthovisc #3 right knee.  2/5/19: Here for follow up, orthovisc #2 right knee. 1/29/19: Here to continue Orthovisc series, #4 today. 80% relief with injection so far. Able to sleep on his side now. Now with recurring R knee pain and symptoms. Pain getting up from a seated position and stiffness with motion. Did well with Orthovisc in the past. Requesting to repeat.  1/22/19: Here for follow up, orthovisc #3.  1/15/19: Here for follow up, orthovisc b/l shoulders #2. He notes relief on the R side.  1/8/19: Here for follow up. The injection last visit has worn off in December. He wants to consider the gel. He is also having left shoulder pain, mostly in the morning.  10/23/18: Here for follow up. He has been improved since the last injection, wore off a few weeks ago.  7/24/18: 76 y/o RHD male here for the R shoulder. He has had pain for 7-8 years. He has pain in the anterior shoulder, pain with activity. He has had cortisone injections, most recently more than 6 months ago. He also tried PT.  MRI R shoulder: Full-thickness and partial tear of supraspinatus tendon. Low-grade partial tear of infraspinatus and high-grade partial tear of subscapularis tendons. Biceps tendon tear. Mild glenohumeral joint degenerative disease with superior migration. Moderate acromioclavicular joint degenerative disease with superior periarticular ganglion cyst formation. [FreeTextEntry1] : knees

## 2024-01-23 NOTE — ASSESSMENT
[FreeTextEntry1] : Bilateral RCT arthropathy.  Bilateral knee DJD. Prior orthovisc with improvement. Bilateral knee CSI 11/30/23. B/L knee orthovisc 1/2/14/23 with improvement. B/l shoulder orthovisc #2  today. RTO.  Procedure Note: Viscosupplementation Injection: X-ray evidence of Osteoarthritis on this or prior visit and Patient has tried OTC's including aspirin, Ibuprofen, Aleve etc or prescription NSAIDs, and/or exercises at home and/ or physical therapy without satisfactory response.   An injection of Orthovisc 2ml was injected into the right and left shoulders. The risks, benefits, and alternatives to Viscosupplementation injection were explained in full to the patient. Risks outlined include but are not limited to infection, sepsis, bleeding, scarring, skin discoloration, temporary increase in pain, syncopal episode, failure to resolve symptoms, allergic reaction, and symptom recurrence. Signs and symptoms of infection reviewed and patient advised to call immediately for redness, fevers, and/or chills. Patient understood the risks. All questions were answered. After discussion of options, patient requested Viscosupplementation. Oral informed consent was obtained and sterile prep of the injection site was performed using alcohol. Sterile technique was utilized for the procedure including the preparation of the solutions used for the injection. Ethyl chloride spray was used topically.  Sterile technique used. Patient tolerated procedure well. Post Procedure Instructions: Patient was advised to call if redness, pain, or fever occur and apply ice for 15 min. out of every hour for the next 12-24 hours as tolerated. patient was advised to rest the joint(s) for 2 days.  Ultrasound Guidance was used for the following reasons: for Glenohumeral injection.  Ultrasound guided injection was performed of the shoulder, visualization of the needle and placement of injection was performed without complication.

## 2024-01-30 ENCOUNTER — APPOINTMENT (OUTPATIENT)
Dept: ORTHOPEDIC SURGERY | Facility: CLINIC | Age: 84
End: 2024-01-30
Payer: MEDICARE

## 2024-01-30 VITALS — WEIGHT: 165 LBS | HEIGHT: 70 IN | BODY MASS INDEX: 23.62 KG/M2

## 2024-01-30 PROCEDURE — 20611 DRAIN/INJ JOINT/BURSA W/US: CPT | Mod: 50

## 2024-01-30 NOTE — ASSESSMENT
[FreeTextEntry1] : Bilateral RCT arthropathy.  Bilateral knee DJD. Prior orthovisc with improvement. Bilateral knee CSI 11/30/23. B/L knee orthovisc 1/2/14/23 with improvement. B/l shoulder orthovisc #3  today. RTO 1 week.  Procedure Note: Viscosupplementation Injection: X-ray evidence of Osteoarthritis on this or prior visit and Patient has tried OTC's including aspirin, Ibuprofen, Aleve etc or prescription NSAIDs, and/or exercises at home and/ or physical therapy without satisfactory response.   An injection of Orthovisc 2ml was injected into the right and left shoulders. The risks, benefits, and alternatives to Viscosupplementation injection were explained in full to the patient. Risks outlined include but are not limited to infection, sepsis, bleeding, scarring, skin discoloration, temporary increase in pain, syncopal episode, failure to resolve symptoms, allergic reaction, and symptom recurrence. Signs and symptoms of infection reviewed and patient advised to call immediately for redness, fevers, and/or chills. Patient understood the risks. All questions were answered. After discussion of options, patient requested Viscosupplementation. Oral informed consent was obtained and sterile prep of the injection site was performed using alcohol. Sterile technique was utilized for the procedure including the preparation of the solutions used for the injection. Ethyl chloride spray was used topically.  Sterile technique used. Patient tolerated procedure well. Post Procedure Instructions: Patient was advised to call if redness, pain, or fever occur and apply ice for 15 min. out of every hour for the next 12-24 hours as tolerated. patient was advised to rest the joint(s) for 2 days.  Ultrasound Guidance was used for the following reasons: for Glenohumeral injection.  Ultrasound guided injection was performed of the shoulder, visualization of the needle and placement of injection was performed without complication.

## 2024-01-30 NOTE — HISTORY OF PRESENT ILLNESS
[de-identified] : 1/30/24: Here for bilateral shoulder orthovisc #3.   01/23/2024: for continued b/l shoulder ov #2  1/16/24:  Here for follow up.  He got excellent improvement from the orthovisc for the knees.  He is having pain in the R. L shoulders.  He has pain at night, awaking him from sleep.   01/02/24: Here for B/L knee orthovisc #3.   12/12/2023: Here for bilateral knee Orthovisc #2.  11/30/23: Here for follow up.  He is having pain in both knees today.  He is in PT for balance but is having pain thats affecting his ability to improve in PT.  Right knee is lateral, left is medial.   6/27/23: Here for L shoulder orthovisc 4.  6/13/23: Here for L shoulder orthovisc #3.  6/6/23: Here for L shoulder orthovisc #2.  5/30/23: Here for R knee orthovisc #4. He was approved for left shoulder orthovisc and is here to start series.  5/23/23: Here for R knee orthovisc #3.  5/16/23: Right knee orthovisc #2.  5/9/23: Here for right knee orthovisc #1. He states injections to shoulders last time helped his right but not left.  4/25/23: Here today for bilateral shoulders.  He has pain at night.  He takes aleve.  He feels pain deep in the shoulder, some down the arm.    6/14/22: Right knee orthovisc #3.  6/7/22: Right knee orthovisc #2.  5/31/22: Here for R knee orthovisc #1.  3/15/22: Here for follow up, he had an episode of the R knee giving out underneath him. He is limping a little. He is in PT for balance.  11/23/21: here for b/l knee orthovisc #4.  11/9/21: here for b/l knee orthovisc #2.  11/2/21: Here for bilateral knee orthovoisc x 1.  7/6/21: Bilateral shoulder synvisc #3 today.  6/29/21: Bilateral shoulder synvisc #2 today.  6/22/21: Here for b/l shoulder synvisc today.  6/8/21: Here for follow up. His knees have improved with orthovisc. His right shoulder is more painful after lifting his wife (who passed recently). Pain is anterior.  3/30/21: Here for bilateral knee orthovisc #4  3/23/21 Orthovisc #3 bilateral knees.  3/16/21: Follow up b/l knees. Here for Orthovisc #2  3/9/21: Here today for b/l knee orthovisc #1.  3/2/21: Here today for b/l knees. R is worse than left. Prior rounds of visco injections had been giving him excellent relief.  5/5/2020- Here for right knee Synvisc #3  3/17/20: Here for R knee, recurring pain and symptoms to repeat visco series. Continues to benefit from visco series shoulders.  1/24/20: The right knee has become more painful. He had a lot of pain and popping recently.  9/10/19: Here for R knee orthovisc #4.  9/3/19: Here for R knee orthovisc #3.  8/27/19: Here for R knee orthovisc #2.  8/20/19: Here to start orthovisc series right knee.  7/9/19: Here for follow up. He had mild relief from the orthovisc in the knee. The shoulders feel great however.  2/19/19: Here for follow up, orthovisc #4 right knee.  2/12/19: Here for follow up, orthovisc #3 right knee.  2/5/19: Here for follow up, orthovisc #2 right knee. 1/29/19: Here to continue Orthovisc series, #4 today. 80% relief with injection so far. Able to sleep on his side now. Now with recurring R knee pain and symptoms. Pain getting up from a seated position and stiffness with motion. Did well with Orthovisc in the past. Requesting to repeat.  1/22/19: Here for follow up, orthovisc #3.  1/15/19: Here for follow up, orthovisc b/l shoulders #2. He notes relief on the R side.  1/8/19: Here for follow up. The injection last visit has worn off in December. He wants to consider the gel. He is also having left shoulder pain, mostly in the morning.  10/23/18: Here for follow up. He has been improved since the last injection, wore off a few weeks ago.  7/24/18: 78 y/o RHD male here for the R shoulder. He has had pain for 7-8 years. He has pain in the anterior shoulder, pain with activity. He has had cortisone injections, most recently more than 6 months ago. He also tried PT.  MRI R shoulder: Full-thickness and partial tear of supraspinatus tendon. Low-grade partial tear of infraspinatus and high-grade partial tear of subscapularis tendons. Biceps tendon tear. Mild glenohumeral joint degenerative disease with superior migration. Moderate acromioclavicular joint degenerative disease with superior periarticular ganglion cyst formation. [FreeTextEntry1] : knees

## 2024-02-06 ENCOUNTER — APPOINTMENT (OUTPATIENT)
Dept: ORTHOPEDIC SURGERY | Facility: CLINIC | Age: 84
End: 2024-02-06
Payer: MEDICARE

## 2024-02-06 VITALS — WEIGHT: 165 LBS | BODY MASS INDEX: 23.62 KG/M2 | HEIGHT: 70 IN

## 2024-02-06 PROCEDURE — 20611 DRAIN/INJ JOINT/BURSA W/US: CPT | Mod: RT

## 2024-02-06 NOTE — ASSESSMENT
[FreeTextEntry1] : Bilateral RCT arthropathy.  Bilateral knee DJD. Prior orthovisc with improvement. Bilateral knee CSI 11/30/23. B/L knee orthovisc 1/2/14/23 with improvement. B/l shoulder orthovisc #4  today. RTO prn.  Procedure Note: Viscosupplementation Injection: X-ray evidence of Osteoarthritis on this or prior visit and Patient has tried OTC's including aspirin, Ibuprofen, Aleve etc or prescription NSAIDs, and/or exercises at home and/ or physical therapy without satisfactory response.   An injection of Orthovisc 2ml was injected into the right and left shoulders. The risks, benefits, and alternatives to Viscosupplementation injection were explained in full to the patient. Risks outlined include but are not limited to infection, sepsis, bleeding, scarring, skin discoloration, temporary increase in pain, syncopal episode, failure to resolve symptoms, allergic reaction, and symptom recurrence. Signs and symptoms of infection reviewed and patient advised to call immediately for redness, fevers, and/or chills. Patient understood the risks. All questions were answered. After discussion of options, patient requested Viscosupplementation. Oral informed consent was obtained and sterile prep of the injection site was performed using alcohol. Sterile technique was utilized for the procedure including the preparation of the solutions used for the injection. Ethyl chloride spray was used topically.  Sterile technique used. Patient tolerated procedure well. Post Procedure Instructions: Patient was advised to call if redness, pain, or fever occur and apply ice for 15 min. out of every hour for the next 12-24 hours as tolerated. patient was advised to rest the joint(s) for 2 days.  Ultrasound Guidance was used for the following reasons: for Glenohumeral injection.  Ultrasound guided injection was performed of the shoulder, visualization of the needle and placement of injection was performed without complication.

## 2024-02-06 NOTE — HISTORY OF PRESENT ILLNESS
[de-identified] : 2/6/24: Here for b/l shoulder orthovisc #4.  1/30/24: Here for bilateral shoulder orthovisc #3.   01/23/2024: for continued b/l shoulder ov #2  1/16/24:  Here for follow up.  He got excellent improvement from the orthovisc for the knees.  He is having pain in the R. L shoulders.  He has pain at night, awaking him from sleep.   01/02/24: Here for B/L knee orthovisc #3.   12/12/2023: Here for bilateral knee Orthovisc #2.  11/30/23: Here for follow up.  He is having pain in both knees today.  He is in PT for balance but is having pain thats affecting his ability to improve in PT.  Right knee is lateral, left is medial.   6/27/23: Here for L shoulder orthovisc 4.  6/13/23: Here for L shoulder orthovisc #3.  6/6/23: Here for L shoulder orthovisc #2.  5/30/23: Here for R knee orthovisc #4. He was approved for left shoulder orthovisc and is here to start series.  5/23/23: Here for R knee orthovisc #3.  5/16/23: Right knee orthovisc #2.  5/9/23: Here for right knee orthovisc #1. He states injections to shoulders last time helped his right but not left.  4/25/23: Here today for bilateral shoulders.  He has pain at night.  He takes aleve.  He feels pain deep in the shoulder, some down the arm.    6/14/22: Right knee orthovisc #3.  6/7/22: Right knee orthovisc #2.  5/31/22: Here for R knee orthovisc #1.  3/15/22: Here for follow up, he had an episode of the R knee giving out underneath him. He is limping a little. He is in PT for balance.  11/23/21: here for b/l knee orthovisc #4.  11/9/21: here for b/l knee orthovisc #2.  11/2/21: Here for bilateral knee orthovoisc x 1.  7/6/21: Bilateral shoulder synvisc #3 today.  6/29/21: Bilateral shoulder synvisc #2 today.  6/22/21: Here for b/l shoulder synvisc today.  6/8/21: Here for follow up. His knees have improved with orthovisc. His right shoulder is more painful after lifting his wife (who passed recently). Pain is anterior.  3/30/21: Here for bilateral knee orthovisc #4  3/23/21 Orthovisc #3 bilateral knees.  3/16/21: Follow up b/l knees. Here for Orthovisc #2  3/9/21: Here today for b/l knee orthovisc #1.  3/2/21: Here today for b/l knees. R is worse than left. Prior rounds of visco injections had been giving him excellent relief.  5/5/2020- Here for right knee Synvisc #3  3/17/20: Here for R knee, recurring pain and symptoms to repeat visco series. Continues to benefit from visco series shoulders.  1/24/20: The right knee has become more painful. He had a lot of pain and popping recently.  9/10/19: Here for R knee orthovisc #4.  9/3/19: Here for R knee orthovisc #3.  8/27/19: Here for R knee orthovisc #2.  8/20/19: Here to start orthovisc series right knee.  7/9/19: Here for follow up. He had mild relief from the orthovisc in the knee. The shoulders feel great however.  2/19/19: Here for follow up, orthovisc #4 right knee.  2/12/19: Here for follow up, orthovisc #3 right knee.  2/5/19: Here for follow up, orthovisc #2 right knee. 1/29/19: Here to continue Orthovisc series, #4 today. 80% relief with injection so far. Able to sleep on his side now. Now with recurring R knee pain and symptoms. Pain getting up from a seated position and stiffness with motion. Did well with Orthovisc in the past. Requesting to repeat.  1/22/19: Here for follow up, orthovisc #3.  1/15/19: Here for follow up, orthovisc b/l shoulders #2. He notes relief on the R side.  1/8/19: Here for follow up. The injection last visit has worn off in December. He wants to consider the gel. He is also having left shoulder pain, mostly in the morning.  10/23/18: Here for follow up. He has been improved since the last injection, wore off a few weeks ago.  7/24/18: 76 y/o RHD male here for the R shoulder. He has had pain for 7-8 years. He has pain in the anterior shoulder, pain with activity. He has had cortisone injections, most recently more than 6 months ago. He also tried PT.  MRI R shoulder: Full-thickness and partial tear of supraspinatus tendon. Low-grade partial tear of infraspinatus and high-grade partial tear of subscapularis tendons. Biceps tendon tear. Mild glenohumeral joint degenerative disease with superior migration. Moderate acromioclavicular joint degenerative disease with superior periarticular ganglion cyst formation.

## 2024-04-09 ENCOUNTER — APPOINTMENT (OUTPATIENT)
Dept: ORTHOPEDIC SURGERY | Facility: CLINIC | Age: 84
End: 2024-04-09
Payer: MEDICARE

## 2024-04-09 VITALS — BODY MASS INDEX: 23.62 KG/M2 | WEIGHT: 165 LBS | HEIGHT: 70 IN

## 2024-04-09 PROCEDURE — 20611 DRAIN/INJ JOINT/BURSA W/US: CPT | Mod: 50

## 2024-04-09 PROCEDURE — 99213 OFFICE O/P EST LOW 20 MIN: CPT | Mod: 25

## 2024-04-09 PROCEDURE — J3490M: CUSTOM

## 2024-04-09 NOTE — HISTORY OF PRESENT ILLNESS
[de-identified] : 2/6/24: Here for b/l shoulder orthovisc #4.  1/30/24: Here for bilateral shoulder orthovisc #3.   01/23/2024: for continued b/l shoulder ov #2  1/16/24:  Here for follow up.  He got excellent improvement from the orthovisc for the knees.  He is having pain in the R. L shoulders.  He has pain at night, awaking him from sleep.   01/02/24: Here for B/L knee orthovisc #3.   12/12/2023: Here for bilateral knee Orthovisc #2.  11/30/23: Here for follow up.  He is having pain in both knees today.  He is in PT for balance but is having pain thats affecting his ability to improve in PT.  Right knee is lateral, left is medial.   6/27/23: Here for L shoulder orthovisc 4.  6/13/23: Here for L shoulder orthovisc #3.  6/6/23: Here for L shoulder orthovisc #2.  5/30/23: Here for R knee orthovisc #4. He was approved for left shoulder orthovisc and is here to start series.  5/23/23: Here for R knee orthovisc #3.  5/16/23: Right knee orthovisc #2.  5/9/23: Here for right knee orthovisc #1. He states injections to shoulders last time helped his right but not left.  4/25/23: Here today for bilateral shoulders.  He has pain at night.  He takes aleve.  He feels pain deep in the shoulder, some down the arm.    6/14/22: Right knee orthovisc #3.  6/7/22: Right knee orthovisc #2.  5/31/22: Here for R knee orthovisc #1.  3/15/22: Here for follow up, he had an episode of the R knee giving out underneath him. He is limping a little. He is in PT for balance.  11/23/21: here for b/l knee orthovisc #4.  11/9/21: here for b/l knee orthovisc #2.  11/2/21: Here for bilateral knee orthovoisc x 1.  7/6/21: Bilateral shoulder synvisc #3 today.  6/29/21: Bilateral shoulder synvisc #2 today.  6/22/21: Here for b/l shoulder synvisc today.  6/8/21: Here for follow up. His knees have improved with orthovisc. His right shoulder is more painful after lifting his wife (who passed recently). Pain is anterior.  3/30/21: Here for bilateral knee orthovisc #4  3/23/21 Orthovisc #3 bilateral knees.  3/16/21: Follow up b/l knees. Here for Orthovisc #2  3/9/21: Here today for b/l knee orthovisc #1.  3/2/21: Here today for b/l knees. R is worse than left. Prior rounds of visco injections had been giving him excellent relief.  5/5/2020- Here for right knee Synvisc #3  3/17/20: Here for R knee, recurring pain and symptoms to repeat visco series. Continues to benefit from visco series shoulders.  1/24/20: The right knee has become more painful. He had a lot of pain and popping recently.  9/10/19: Here for R knee orthovisc #4.  9/3/19: Here for R knee orthovisc #3.  8/27/19: Here for R knee orthovisc #2.  8/20/19: Here to start orthovisc series right knee.  7/9/19: Here for follow up. He had mild relief from the orthovisc in the knee. The shoulders feel great however.  2/19/19: Here for follow up, orthovisc #4 right knee.  2/12/19: Here for follow up, orthovisc #3 right knee.  2/5/19: Here for follow up, orthovisc #2 right knee. 1/29/19: Here to continue Orthovisc series, #4 today. 80% relief with injection so far. Able to sleep on his side now. Now with recurring R knee pain and symptoms. Pain getting up from a seated position and stiffness with motion. Did well with Orthovisc in the past. Requesting to repeat.  1/22/19: Here for follow up, orthovisc #3.  1/15/19: Here for follow up, orthovisc b/l shoulders #2. He notes relief on the R side.  1/8/19: Here for follow up. The injection last visit has worn off in December. He wants to consider the gel. He is also having left shoulder pain, mostly in the morning.  10/23/18: Here for follow up. He has been improved since the last injection, wore off a few weeks ago.  7/24/18: 78 y/o RHD male here for the R shoulder. He has had pain for 7-8 years. He has pain in the anterior shoulder, pain with activity. He has had cortisone injections, most recently more than 6 months ago. He also tried PT.  MRI R shoulder: Full-thickness and partial tear of supraspinatus tendon. Low-grade partial tear of infraspinatus and high-grade partial tear of subscapularis tendons. Biceps tendon tear. Mild glenohumeral joint degenerative disease with superior migration. Moderate acromioclavicular joint degenerative disease with superior periarticular ganglion cyst formation.

## 2024-04-09 NOTE — ASSESSMENT
[FreeTextEntry1] : Bilateral RCT arthropathy.  Bilateral knee DJD. Prior orthovisc with improvement. Bilateral knee CSI 11/30/23. B/L knee orthovisc 1/2/23 with improvement. B/l shoulder orthovisc completed 2/6/24. RTO prn.  Procedure Note: Large Joint Injection was performed because of pain and inflammation, failure of conservative treatment.   Medications: Depo-Medrol: 1 cc, 80 mg. Lidocaine: 2 cc, 1%.  Marcaine: 2 cc, .25%.   Medication was injected in the bilateral knee joint. Patient has tried OTC's including aspirin, Ibuprofen, Aleve etc or prescription NSAIDs, and/or exercises at home and/ or physical therapy without satisfactory response. The risks, benefits, and alternatives to cortisone injection were explained in full to the patient. Risks outlined include but are not limited to infection, sepsis, bleeding, scarring, skin discoloration, temporary increase in pain, syncopal episode, failure to resolve symptoms, allergic reaction, symptom recurrence, and elevation of blood sugar in diabetics. Patient understood the risks. All questions were answered. After discussion of options, patient requested an injection. Oral informed consent was obtained and sterile prep of the injection site was performed using alcohol. Sterile technique was utilized for the procedure including the preparation of the solutions used for the injection. Ethyl chloride spray was used topically.  Sterile technique used. Patient tolerated procedure well. Post Procedure Instructions: Patient was advised to call if redness, pain, or fever occur and apply ice for 15 min. out of every hour for the next 12-24 hours as tolerated. patient was advised to rest the joint(s) for 2 days.

## 2024-06-20 ENCOUNTER — APPOINTMENT (OUTPATIENT)
Dept: ORTHOPEDIC SURGERY | Facility: CLINIC | Age: 84
End: 2024-06-20
Payer: MEDICARE

## 2024-06-20 VITALS — HEIGHT: 70 IN | WEIGHT: 165 LBS | BODY MASS INDEX: 23.62 KG/M2

## 2024-06-20 DIAGNOSIS — M75.101 UNSPECIFIED ROTATOR CUFF TEAR OR RUPTURE OF RIGHT SHOULDER, NOT SPECIFIED AS TRAUMATIC: ICD-10-CM

## 2024-06-20 DIAGNOSIS — M75.102 UNSPECIFIED ROTATOR CUFF TEAR OR RUPTURE OF LEFT SHOULDER, NOT SPECIFIED AS TRAUMATIC: ICD-10-CM

## 2024-06-20 DIAGNOSIS — M12.811 UNSPECIFIED ROTATOR CUFF TEAR OR RUPTURE OF RIGHT SHOULDER, NOT SPECIFIED AS TRAUMATIC: ICD-10-CM

## 2024-06-20 DIAGNOSIS — M12.812 UNSPECIFIED ROTATOR CUFF TEAR OR RUPTURE OF LEFT SHOULDER, NOT SPECIFIED AS TRAUMATIC: ICD-10-CM

## 2024-06-20 DIAGNOSIS — M17.11 UNILATERAL PRIMARY OSTEOARTHRITIS, RIGHT KNEE: ICD-10-CM

## 2024-06-20 PROCEDURE — 99213 OFFICE O/P EST LOW 20 MIN: CPT

## 2024-06-20 NOTE — ASSESSMENT
[FreeTextEntry1] : Bilateral RCT arthropathy.  Bilateral knee DJD. Bilateral knee CSI 4/9/24 B/L knee orthovisc 1/2/23 with improvement. B/l shoulder orthovisc completed 2/6/24. RTO prn.

## 2024-06-20 NOTE — HISTORY OF PRESENT ILLNESS
[de-identified] : 6/20/24: Here for follow up.   He continues to have pain in the knees.  His shoulders are feeling better since orthovisc injection.   4/9/24: Here for bilateral knees.   2/6/24: Here for b/l shoulder orthovisc #4.  1/30/24: Here for bilateral shoulder orthovisc #3.   01/23/2024: for continued b/l shoulder ov #2  1/16/24:  Here for follow up.  He got excellent improvement from the orthovisc for the knees.  He is having pain in the R. L shoulders.  He has pain at night, awaking him from sleep.   01/02/24: Here for B/L knee orthovisc #3.   12/12/2023: Here for bilateral knee Orthovisc #2.  11/30/23: Here for follow up.  He is having pain in both knees today.  He is in PT for balance but is having pain thats affecting his ability to improve in PT.  Right knee is lateral, left is medial.   6/27/23: Here for L shoulder orthovisc 4.  6/13/23: Here for L shoulder orthovisc #3.  6/6/23: Here for L shoulder orthovisc #2.  5/30/23: Here for R knee orthovisc #4. He was approved for left shoulder orthovisc and is here to start series.  5/23/23: Here for R knee orthovisc #3.  5/16/23: Right knee orthovisc #2.  5/9/23: Here for right knee orthovisc #1. He states injections to shoulders last time helped his right but not left.  4/25/23: Here today for bilateral shoulders.  He has pain at night.  He takes aleve.  He feels pain deep in the shoulder, some down the arm.    6/14/22: Right knee orthovisc #3.  6/7/22: Right knee orthovisc #2.  5/31/22: Here for R knee orthovisc #1.  3/15/22: Here for follow up, he had an episode of the R knee giving out underneath him. He is limping a little. He is in PT for balance.  11/23/21: here for b/l knee orthovisc #4.  11/9/21: here for b/l knee orthovisc #2.  11/2/21: Here for bilateral knee orthovoisc x 1.  7/6/21: Bilateral shoulder synvisc #3 today.  6/29/21: Bilateral shoulder synvisc #2 today.  6/22/21: Here for b/l shoulder synvisc today.  6/8/21: Here for follow up. His knees have improved with orthovisc. His right shoulder is more painful after lifting his wife (who passed recently). Pain is anterior.  3/30/21: Here for bilateral knee orthovisc #4  3/23/21 Orthovisc #3 bilateral knees.  3/16/21: Follow up b/l knees. Here for Orthovisc #2  3/9/21: Here today for b/l knee orthovisc #1.  3/2/21: Here today for b/l knees. R is worse than left. Prior rounds of visco injections had been giving him excellent relief.  5/5/2020- Here for right knee Synvisc #3  3/17/20: Here for R knee, recurring pain and symptoms to repeat visco series. Continues to benefit from visco series shoulders.  1/24/20: The right knee has become more painful. He had a lot of pain and popping recently.  9/10/19: Here for R knee orthovisc #4.  9/3/19: Here for R knee orthovisc #3.  8/27/19: Here for R knee orthovisc #2.  8/20/19: Here to start orthovisc series right knee.  7/9/19: Here for follow up. He had mild relief from the orthovisc in the knee. The shoulders feel great however.  2/19/19: Here for follow up, orthovisc #4 right knee.  2/12/19: Here for follow up, orthovisc #3 right knee.  2/5/19: Here for follow up, orthovisc #2 right knee. 1/29/19: Here to continue Orthovisc series, #4 today. 80% relief with injection so far. Able to sleep on his side now. Now with recurring R knee pain and symptoms. Pain getting up from a seated position and stiffness with motion. Did well with Orthovisc in the past. Requesting to repeat.  1/22/19: Here for follow up, orthovisc #3.  1/15/19: Here for follow up, orthovisc b/l shoulders #2. He notes relief on the R side.  1/8/19: Here for follow up. The injection last visit has worn off in December. He wants to consider the gel. He is also having left shoulder pain, mostly in the morning.  10/23/18: Here for follow up. He has been improved since the last injection, wore off a few weeks ago.  7/24/18: 76 y/o RHD male here for the R shoulder. He has had pain for 7-8 years. He has pain in the anterior shoulder, pain with activity. He has had cortisone injections, most recently more than 6 months ago. He also tried PT.  MRI R shoulder: Full-thickness and partial tear of supraspinatus tendon. Low-grade partial tear of infraspinatus and high-grade partial tear of subscapularis tendons. Biceps tendon tear. Mild glenohumeral joint degenerative disease with superior migration. Moderate acromioclavicular joint degenerative disease with superior periarticular ganglion cyst formation.

## 2024-06-24 ENCOUNTER — APPOINTMENT (OUTPATIENT)
Dept: ORTHOPEDIC SURGERY | Facility: CLINIC | Age: 84
End: 2024-06-24

## 2024-06-24 VITALS — HEIGHT: 70 IN | WEIGHT: 165 LBS | BODY MASS INDEX: 23.62 KG/M2

## 2024-06-24 DIAGNOSIS — M17.12 UNILATERAL PRIMARY OSTEOARTHRITIS, LEFT KNEE: ICD-10-CM

## 2024-06-24 PROCEDURE — 20610 DRAIN/INJ JOINT/BURSA W/O US: CPT | Mod: LT

## 2024-06-24 PROCEDURE — 99203 OFFICE O/P NEW LOW 30 MIN: CPT | Mod: 25

## 2024-07-09 ENCOUNTER — APPOINTMENT (OUTPATIENT)
Dept: ORTHOPEDIC SURGERY | Facility: CLINIC | Age: 84
End: 2024-07-09

## 2024-07-09 VITALS — WEIGHT: 165 LBS | HEIGHT: 70 IN | BODY MASS INDEX: 23.62 KG/M2

## 2024-07-09 PROCEDURE — 20611 DRAIN/INJ JOINT/BURSA W/US: CPT | Mod: 50

## 2024-07-16 ENCOUNTER — APPOINTMENT (OUTPATIENT)
Dept: ORTHOPEDIC SURGERY | Facility: CLINIC | Age: 84
End: 2024-07-16
Payer: MEDICARE

## 2024-07-16 VITALS — BODY MASS INDEX: 23.62 KG/M2 | HEIGHT: 70 IN | WEIGHT: 165 LBS

## 2024-07-16 PROCEDURE — 20610 DRAIN/INJ JOINT/BURSA W/O US: CPT | Mod: 50

## 2024-07-23 ENCOUNTER — APPOINTMENT (OUTPATIENT)
Dept: ORTHOPEDIC SURGERY | Facility: CLINIC | Age: 84
End: 2024-07-23
Payer: MEDICARE

## 2024-07-23 VITALS — WEIGHT: 165 LBS | BODY MASS INDEX: 23.62 KG/M2 | HEIGHT: 70 IN

## 2024-07-23 PROCEDURE — 20610 DRAIN/INJ JOINT/BURSA W/O US: CPT | Mod: 50

## 2024-07-23 NOTE — ASSESSMENT
[FreeTextEntry1] : Bilateral RCT arthropathy.  Bilateral knee DJD. Bilateral knee CSI 4/9/24 B/L knee orthovisc 1/2/23 with improvement. B/l shoulder orthovisc completed 2/6/24. B/l knee orthovisc #3 today. RTO 1 week to continue series.  Procedure Note: Viscosupplementation Injection: X-ray evidence of Osteoarthritis on this or prior visit and Patient has tried OTC's including aspirin, Ibuprofen, Aleve etc or prescription NSAIDs, and/or exercises at home and/ or physical therapy without satisfactory response.   An injection of Orthovisc 2ml was injected into the bilateral knees. The risks, benefits, and alternatives to Viscosupplementation injection were explained in full to the patient. Risks outlined include but are not limited to infection, sepsis, bleeding, scarring, skin discoloration, temporary increase in pain, syncopal episode, failure to resolve symptoms, allergic reaction, and symptom recurrence. Signs and symptoms of infection reviewed and patient advised to call immediately for redness, fevers, and/or chills. Patient understood the risks. All questions were answered. After discussion of options, patient requested Viscosupplementation. Oral informed consent was obtained and sterile prep of the injection site was performed using alcohol. Sterile technique was utilized for the procedure including the preparation of the solutions used for the injection. Ethyl chloride spray was used topically.  Sterile technique used. Patient tolerated procedure well. Post Procedure Instructions: Patient was advised to call if redness, pain, or fever occur and apply ice for 15 min. out of every hour for the next 12-24 hours as tolerated. patient was advised to rest the joint(s) for 2 days.

## 2024-07-30 ENCOUNTER — APPOINTMENT (OUTPATIENT)
Dept: ORTHOPEDIC SURGERY | Facility: CLINIC | Age: 84
End: 2024-07-30
Payer: MEDICARE

## 2024-07-30 VITALS — WEIGHT: 165 LBS | BODY MASS INDEX: 23.62 KG/M2 | HEIGHT: 70 IN

## 2024-07-30 DIAGNOSIS — M17.11 UNILATERAL PRIMARY OSTEOARTHRITIS, RIGHT KNEE: ICD-10-CM

## 2024-07-30 DIAGNOSIS — M17.12 UNILATERAL PRIMARY OSTEOARTHRITIS, LEFT KNEE: ICD-10-CM

## 2024-07-30 PROCEDURE — 20610 DRAIN/INJ JOINT/BURSA W/O US: CPT | Mod: 50

## 2024-07-30 NOTE — HISTORY OF PRESENT ILLNESS
[de-identified] : 7/30/24: Here for bilateral knee orthovisc #4.  7/23/24: Here for b/l knee orthovisc #3  7/16/24: Here for b/l knee orthovisc #2  7/9/24: Here to begin bilateral knee orthovisc injections. He had good relief with prior series up until the last 2 weeks. No new injury.  6/20/24: Here for follow up.   He continues to have pain in the knees.  His shoulders are feeling better since orthovisc injection.   4/9/24: Here for bilateral knees.   2/6/24: Here for b/l shoulder orthovisc #4.  1/30/24: Here for bilateral shoulder orthovisc #3.   01/23/2024: for continued b/l shoulder ov #2  1/16/24:  Here for follow up.  He got excellent improvement from the orthovisc for the knees.  He is having pain in the R. L shoulders.  He has pain at night, awaking him from sleep.   01/02/24: Here for B/L knee orthovisc #3.   12/12/2023: Here for bilateral knee Orthovisc #2.  11/30/23: Here for follow up.  He is having pain in both knees today.  He is in PT for balance but is having pain thats affecting his ability to improve in PT.  Right knee is lateral, left is medial.   6/27/23: Here for L shoulder orthovisc 4.  6/13/23: Here for L shoulder orthovisc #3.  6/6/23: Here for L shoulder orthovisc #2.  5/30/23: Here for R knee orthovisc #4. He was approved for left shoulder orthovisc and is here to start series.  5/23/23: Here for R knee orthovisc #3.  5/16/23: Right knee orthovisc #2.  5/9/23: Here for right knee orthovisc #1. He states injections to shoulders last time helped his right but not left.  4/25/23: Here today for bilateral shoulders.  He has pain at night.  He takes aleve.  He feels pain deep in the shoulder, some down the arm.    6/14/22: Right knee orthovisc #3.  6/7/22: Right knee orthovisc #2.  5/31/22: Here for R knee orthovisc #1.  3/15/22: Here for follow up, he had an episode of the R knee giving out underneath him. He is limping a little. He is in PT for balance.  11/23/21: here for b/l knee orthovisc #4.  11/9/21: here for b/l knee orthovisc #2.  11/2/21: Here for bilateral knee orthovoisc x 1.  7/6/21: Bilateral shoulder synvisc #3 today.  6/29/21: Bilateral shoulder synvisc #2 today.  6/22/21: Here for b/l shoulder synvisc today.  6/8/21: Here for follow up. His knees have improved with orthovisc. His right shoulder is more painful after lifting his wife (who passed recently). Pain is anterior.  3/30/21: Here for bilateral knee orthovisc #4  3/23/21 Orthovisc #3 bilateral knees.  3/16/21: Follow up b/l knees. Here for Orthovisc #2  3/9/21: Here today for b/l knee orthovisc #1.  3/2/21: Here today for b/l knees. R is worse than left. Prior rounds of visco injections had been giving him excellent relief.  5/5/2020- Here for right knee Synvisc #3  3/17/20: Here for R knee, recurring pain and symptoms to repeat visco series. Continues to benefit from visco series shoulders.  1/24/20: The right knee has become more painful. He had a lot of pain and popping recently.  9/10/19: Here for R knee orthovisc #4.  9/3/19: Here for R knee orthovisc #3.  8/27/19: Here for R knee orthovisc #2.  8/20/19: Here to start orthovisc series right knee.  7/9/19: Here for follow up. He had mild relief from the orthovisc in the knee. The shoulders feel great however.  2/19/19: Here for follow up, orthovisc #4 right knee.  2/12/19: Here for follow up, orthovisc #3 right knee.  2/5/19: Here for follow up, orthovisc #2 right knee. 1/29/19: Here to continue Orthovisc series, #4 today. 80% relief with injection so far. Able to sleep on his side now. Now with recurring R knee pain and symptoms. Pain getting up from a seated position and stiffness with motion. Did well with Orthovisc in the past. Requesting to repeat.  1/22/19: Here for follow up, orthovisc #3.  1/15/19: Here for follow up, orthovisc b/l shoulders #2. He notes relief on the R side.  1/8/19: Here for follow up. The injection last visit has worn off in December. He wants to consider the gel. He is also having left shoulder pain, mostly in the morning.  10/23/18: Here for follow up. He has been improved since the last injection, wore off a few weeks ago.  7/24/18: 78 y/o RHD male here for the R shoulder. He has had pain for 7-8 years. He has pain in the anterior shoulder, pain with activity. He has had cortisone injections, most recently more than 6 months ago. He also tried PT.  MRI R shoulder: Full-thickness and partial tear of supraspinatus tendon. Low-grade partial tear of infraspinatus and high-grade partial tear of subscapularis tendons. Biceps tendon tear. Mild glenohumeral joint degenerative disease with superior migration. Moderate acromioclavicular joint degenerative disease with superior periarticular ganglion cyst formation.

## 2024-07-30 NOTE — ASSESSMENT
[FreeTextEntry1] : Bilateral RCT arthropathy.  Bilateral knee DJD. Bilateral knee CSI 4/9/24 B/L knee orthovisc 1/2/23 with improvement. B/l shoulder orthovisc completed 2/6/24. B/l knee orthovisc #4 today. RTO prn.  Procedure Note: Viscosupplementation Injection: X-ray evidence of Osteoarthritis on this or prior visit and Patient has tried OTC's including aspirin, Ibuprofen, Aleve etc or prescription NSAIDs, and/or exercises at home and/ or physical therapy without satisfactory response.   An injection of Orthovisc 2ml was injected into the bilateral knees. The risks, benefits, and alternatives to Viscosupplementation injection were explained in full to the patient. Risks outlined include but are not limited to infection, sepsis, bleeding, scarring, skin discoloration, temporary increase in pain, syncopal episode, failure to resolve symptoms, allergic reaction, and symptom recurrence. Signs and symptoms of infection reviewed and patient advised to call immediately for redness, fevers, and/or chills. Patient understood the risks. All questions were answered. After discussion of options, patient requested Viscosupplementation. Oral informed consent was obtained and sterile prep of the injection site was performed using alcohol. Sterile technique was utilized for the procedure including the preparation of the solutions used for the injection. Ethyl chloride spray was used topically.  Sterile technique used. Patient tolerated procedure well. Post Procedure Instructions: Patient was advised to call if redness, pain, or fever occur and apply ice for 15 min. out of every hour for the next 12-24 hours as tolerated. patient was advised to rest the joint(s) for 2 days.

## 2024-09-20 ENCOUNTER — APPOINTMENT (OUTPATIENT)
Dept: ORTHOPEDIC SURGERY | Facility: CLINIC | Age: 84
End: 2024-09-20

## 2024-09-20 DIAGNOSIS — M17.11 UNILATERAL PRIMARY OSTEOARTHRITIS, RIGHT KNEE: ICD-10-CM

## 2024-09-20 PROCEDURE — 99203 OFFICE O/P NEW LOW 30 MIN: CPT | Mod: 25

## 2024-09-20 PROCEDURE — J3490M: CUSTOM

## 2024-09-20 PROCEDURE — 20610 DRAIN/INJ JOINT/BURSA W/O US: CPT | Mod: RT

## 2024-09-20 PROCEDURE — 99213 OFFICE O/P EST LOW 20 MIN: CPT | Mod: 25

## 2024-09-20 RX ORDER — CELECOXIB 200 MG/1
200 CAPSULE ORAL TWICE DAILY
Qty: 60 | Refills: 3 | Status: ACTIVE | COMMUNITY
Start: 2024-09-20 | End: 1900-01-01

## 2024-09-20 NOTE — IMAGING
[de-identified] : right knee with mild swelling ttp over lateral joint line  there is no ligamentous laxity noted Spring Sign is negative. Jeniffer Test is mildly positive to the lateral joint line. strength is 5/5 ROM is from 5-120 deg with subpatella crepitus noted Ipsilateral hip with full pain free ROM. minimal effusion. gait is mildly antalgic to the right.  Pt declines new xrays today. Previous xrays reviewed 11/23 which shows right knee moderate tricompartmental OA.

## 2024-09-20 NOTE — HISTORY OF PRESENT ILLNESS
[de-identified] :  09/20/2024: 84 year old male presenting with rt knee pain. pain started approximately 2 days ago. pt fell fell this morning and pain worsens. reports buckling of knee. pain is localized.   7/30/24: Here for bilateral knee orthovisc #4.  7/23/24: Here for b/l knee orthovisc #3  7/16/24: Here for b/l knee orthovisc #2  7/9/24: Here to begin bilateral knee orthovisc injections. He had good relief with prior series up until the last 2 weeks. No new injury.  6/20/24: Here for follow up.   He continues to have pain in the knees.  His shoulders are feeling better since orthovisc injection.   4/9/24: Here for bilateral knees.   2/6/24: Here for b/l shoulder orthovisc #4.  1/30/24: Here for bilateral shoulder orthovisc #3.   01/23/2024: for continued b/l shoulder ov #2  1/16/24:  Here for follow up.  He got excellent improvement from the orthovisc for the knees.  He is having pain in the R. L shoulders.  He has pain at night, awaking him from sleep.   01/02/24: Here for B/L knee orthovisc #3.   12/12/2023: Here for bilateral knee Orthovisc #2.  11/30/23: Here for follow up.  He is having pain in both knees today.  He is in PT for balance but is having pain thats affecting his ability to improve in PT.  Right knee is lateral, left is medial.   6/27/23: Here for L shoulder orthovisc 4.  6/13/23: Here for L shoulder orthovisc #3.  6/6/23: Here for L shoulder orthovisc #2.  5/30/23: Here for R knee orthovisc #4. He was approved for left shoulder orthovisc and is here to start series.  5/23/23: Here for R knee orthovisc #3.  5/16/23: Right knee orthovisc #2.  5/9/23: Here for right knee orthovisc #1. He states injections to shoulders last time helped his right but not left.  4/25/23: Here today for bilateral shoulders.  He has pain at night.  He takes aleve.  He feels pain deep in the shoulder, some down the arm.    6/14/22: Right knee orthovisc #3.  6/7/22: Right knee orthovisc #2.  5/31/22: Here for R knee orthovisc #1.  3/15/22: Here for follow up, he had an episode of the R knee giving out underneath him. He is limping a little. He is in PT for balance.  11/23/21: here for b/l knee orthovisc #4.  11/9/21: here for b/l knee orthovisc #2.  11/2/21: Here for bilateral knee orthovoisc x 1.  7/6/21: Bilateral shoulder synvisc #3 today.  6/29/21: Bilateral shoulder synvisc #2 today.  6/22/21: Here for b/l shoulder synvisc today.  6/8/21: Here for follow up. His knees have improved with orthovisc. His right shoulder is more painful after lifting his wife (who passed recently). Pain is anterior.  3/30/21: Here for bilateral knee orthovisc #4  3/23/21 Orthovisc #3 bilateral knees.  3/16/21: Follow up b/l knees. Here for Orthovisc #2  3/9/21: Here today for b/l knee orthovisc #1.  3/2/21: Here today for b/l knees. R is worse than left. Prior rounds of visco injections had been giving him excellent relief.  5/5/2020- Here for right knee Synvisc #3  3/17/20: Here for R knee, recurring pain and symptoms to repeat visco series. Continues to benefit from visco series shoulders.  1/24/20: The right knee has become more painful. He had a lot of pain and popping recently.  9/10/19: Here for R knee orthovisc #4.  9/3/19: Here for R knee orthovisc #3.  8/27/19: Here for R knee orthovisc #2.  8/20/19: Here to start orthovisc series right knee.  7/9/19: Here for follow up. He had mild relief from the orthovisc in the knee. The shoulders feel great however.  2/19/19: Here for follow up, orthovisc #4 right knee.  2/12/19: Here for follow up, orthovisc #3 right knee.  2/5/19: Here for follow up, orthovisc #2 right knee. 1/29/19: Here to continue Orthovisc series, #4 today. 80% relief with injection so far. Able to sleep on his side now. Now with recurring R knee pain and symptoms. Pain getting up from a seated position and stiffness with motion. Did well with Orthovisc in the past. Requesting to repeat.  1/22/19: Here for follow up, orthovisc #3.  1/15/19: Here for follow up, orthovisc b/l shoulders #2. He notes relief on the R side.  1/8/19: Here for follow up. The injection last visit has worn off in December. He wants to consider the gel. He is also having left shoulder pain, mostly in the morning.  10/23/18: Here for follow up. He has been improved since the last injection, wore off a few weeks ago.  7/24/18: 78 y/o RHD male here for the R shoulder. He has had pain for 7-8 years. He has pain in the anterior shoulder, pain with activity. He has had cortisone injections, most recently more than 6 months ago. He also tried PT.  MRI R shoulder: Full-thickness and partial tear of supraspinatus tendon. Low-grade partial tear of infraspinatus and high-grade partial tear of subscapularis tendons. Biceps tendon tear. Mild glenohumeral joint degenerative disease with superior migration. Moderate acromioclavicular joint degenerative disease with superior periarticular ganglion cyst formation.

## 2024-09-20 NOTE — ASSESSMENT
[FreeTextEntry1] : Large joint injection of the right knee was performed. The indication for this procedure was pain, inflammation. The site was prepped with alcohol, ethyl chloride sprayed topically and sterile technique used. An injection of Lidocaine 3cc of 2% , Bupivacaine (Marcaine) 3cc of 0.5% , Triamcinolone (Kenalog) 2cc of 40 mg  was used.   Patient tolerated procedure well. Patient was advised to call if redness, pain or fever occur and apply ice for 15 minutes out of every hour for the next 12-24 hours as tolerated. The risks benefits, and alternatives have been discussed, and verbal consent was obtained.  Pt provided right knee CSI.   Pt will rto prn.   celebrex 200 mg bid prn pain #60 provided.  NSAIDs recommended.  Patient warned of risk of NSAID medication to stomach and GI tract, risk of increase blood pressure, cardiac risk, and risk of fluid retention.  The patient should clear taking medication with internist/PMD if any problem with heart, blood pressure, or GI system exists.

## 2024-12-17 ENCOUNTER — APPOINTMENT (OUTPATIENT)
Dept: ORTHOPEDIC SURGERY | Facility: CLINIC | Age: 84
End: 2024-12-17
Payer: MEDICARE

## 2024-12-17 DIAGNOSIS — M17.12 UNILATERAL PRIMARY OSTEOARTHRITIS, LEFT KNEE: ICD-10-CM

## 2024-12-17 PROCEDURE — 20610 DRAIN/INJ JOINT/BURSA W/O US: CPT | Mod: 50

## 2024-12-24 ENCOUNTER — APPOINTMENT (OUTPATIENT)
Dept: ORTHOPEDIC SURGERY | Facility: CLINIC | Age: 84
End: 2024-12-24
Payer: MEDICARE

## 2024-12-24 PROCEDURE — 20610 DRAIN/INJ JOINT/BURSA W/O US: CPT | Mod: 50

## 2024-12-31 ENCOUNTER — APPOINTMENT (OUTPATIENT)
Dept: ORTHOPEDIC SURGERY | Facility: CLINIC | Age: 84
End: 2024-12-31
Payer: MEDICARE

## 2024-12-31 PROCEDURE — 20610 DRAIN/INJ JOINT/BURSA W/O US: CPT | Mod: 50

## 2025-01-07 ENCOUNTER — APPOINTMENT (OUTPATIENT)
Dept: ORTHOPEDIC SURGERY | Facility: CLINIC | Age: 85
End: 2025-01-07
Payer: MEDICARE

## 2025-01-07 PROCEDURE — 20610 DRAIN/INJ JOINT/BURSA W/O US: CPT | Mod: 50

## 2025-01-14 ENCOUNTER — APPOINTMENT (OUTPATIENT)
Dept: ORTHOPEDIC SURGERY | Facility: CLINIC | Age: 85
End: 2025-01-14
Payer: MEDICARE

## 2025-01-14 DIAGNOSIS — M17.12 UNILATERAL PRIMARY OSTEOARTHRITIS, LEFT KNEE: ICD-10-CM

## 2025-01-14 DIAGNOSIS — M17.11 UNILATERAL PRIMARY OSTEOARTHRITIS, RIGHT KNEE: ICD-10-CM

## 2025-01-14 PROCEDURE — 20610 DRAIN/INJ JOINT/BURSA W/O US: CPT | Mod: 50

## 2025-02-06 ENCOUNTER — INPATIENT (INPATIENT)
Facility: HOSPITAL | Age: 85
LOS: 4 days | Discharge: NURSING, REHAB OR LONG-TERM CARE | End: 2025-02-11
Attending: STUDENT IN AN ORGANIZED HEALTH CARE EDUCATION/TRAINING PROGRAM | Admitting: STUDENT IN AN ORGANIZED HEALTH CARE EDUCATION/TRAINING PROGRAM
Payer: MEDICARE

## 2025-02-06 VITALS
WEIGHT: 169.98 LBS | HEART RATE: 95 BPM | RESPIRATION RATE: 18 BRPM | OXYGEN SATURATION: 98 % | DIASTOLIC BLOOD PRESSURE: 76 MMHG | TEMPERATURE: 98 F | SYSTOLIC BLOOD PRESSURE: 156 MMHG | HEIGHT: 70 IN

## 2025-02-06 DIAGNOSIS — E78.5 HYPERLIPIDEMIA, UNSPECIFIED: ICD-10-CM

## 2025-02-06 DIAGNOSIS — S72.001A FRACTURE OF UNSPECIFIED PART OF NECK OF RIGHT FEMUR, INITIAL ENCOUNTER FOR CLOSED FRACTURE: ICD-10-CM

## 2025-02-06 DIAGNOSIS — I10 ESSENTIAL (PRIMARY) HYPERTENSION: ICD-10-CM

## 2025-02-06 DIAGNOSIS — N40.0 BENIGN PROSTATIC HYPERPLASIA WITHOUT LOWER URINARY TRACT SYMPTOMS: ICD-10-CM

## 2025-02-06 DIAGNOSIS — Z01.818 ENCOUNTER FOR OTHER PREPROCEDURAL EXAMINATION: ICD-10-CM

## 2025-02-06 LAB
ANION GAP SERPL CALC-SCNC: 6 MMOL/L — SIGNIFICANT CHANGE UP (ref 5–17)
APTT BLD: 32.1 SEC — SIGNIFICANT CHANGE UP (ref 24.5–35.6)
BASOPHILS # BLD AUTO: 0.03 K/UL — SIGNIFICANT CHANGE UP (ref 0–0.2)
BASOPHILS NFR BLD AUTO: 0.5 % — SIGNIFICANT CHANGE UP (ref 0–2)
BUN SERPL-MCNC: 19 MG/DL — SIGNIFICANT CHANGE UP (ref 7–23)
CALCIUM SERPL-MCNC: 8.9 MG/DL — SIGNIFICANT CHANGE UP (ref 8.5–10.1)
CHLORIDE SERPL-SCNC: 111 MMOL/L — HIGH (ref 96–108)
CO2 SERPL-SCNC: 26 MMOL/L — SIGNIFICANT CHANGE UP (ref 22–31)
CREAT SERPL-MCNC: 1.1 MG/DL — SIGNIFICANT CHANGE UP (ref 0.5–1.3)
EGFR: 67 ML/MIN/1.73M2 — SIGNIFICANT CHANGE UP
EOSINOPHIL # BLD AUTO: 0.31 K/UL — SIGNIFICANT CHANGE UP (ref 0–0.5)
EOSINOPHIL NFR BLD AUTO: 4.8 % — SIGNIFICANT CHANGE UP (ref 0–6)
GLUCOSE SERPL-MCNC: 124 MG/DL — HIGH (ref 70–99)
HCT VFR BLD CALC: 37.5 % — LOW (ref 39–50)
HGB BLD-MCNC: 12.7 G/DL — LOW (ref 13–17)
IMM GRANULOCYTES NFR BLD AUTO: 0.3 % — SIGNIFICANT CHANGE UP (ref 0–0.9)
INR BLD: 1.03 RATIO — SIGNIFICANT CHANGE UP (ref 0.85–1.16)
LYMPHOCYTES # BLD AUTO: 0.86 K/UL — LOW (ref 1–3.3)
LYMPHOCYTES # BLD AUTO: 13.2 % — SIGNIFICANT CHANGE UP (ref 13–44)
MCHC RBC-ENTMCNC: 32.5 PG — SIGNIFICANT CHANGE UP (ref 27–34)
MCHC RBC-ENTMCNC: 33.9 G/DL — SIGNIFICANT CHANGE UP (ref 32–36)
MCV RBC AUTO: 95.9 FL — SIGNIFICANT CHANGE UP (ref 80–100)
MONOCYTES # BLD AUTO: 0.5 K/UL — SIGNIFICANT CHANGE UP (ref 0–0.9)
MONOCYTES NFR BLD AUTO: 7.7 % — SIGNIFICANT CHANGE UP (ref 2–14)
NEUTROPHILS # BLD AUTO: 4.8 K/UL — SIGNIFICANT CHANGE UP (ref 1.8–7.4)
NEUTROPHILS NFR BLD AUTO: 73.5 % — SIGNIFICANT CHANGE UP (ref 43–77)
NRBC # BLD: 0 /100 WBCS — SIGNIFICANT CHANGE UP (ref 0–0)
NRBC BLD-RTO: 0 /100 WBCS — SIGNIFICANT CHANGE UP (ref 0–0)
PLATELET # BLD AUTO: 191 K/UL — SIGNIFICANT CHANGE UP (ref 150–400)
POTASSIUM SERPL-MCNC: 4.9 MMOL/L — SIGNIFICANT CHANGE UP (ref 3.5–5.3)
POTASSIUM SERPL-SCNC: 4.9 MMOL/L — SIGNIFICANT CHANGE UP (ref 3.5–5.3)
PROTHROM AB SERPL-ACNC: 11.6 SEC — SIGNIFICANT CHANGE UP (ref 9.9–13.4)
RBC # BLD: 3.91 M/UL — LOW (ref 4.2–5.8)
RBC # FLD: 13.2 % — SIGNIFICANT CHANGE UP (ref 10.3–14.5)
SODIUM SERPL-SCNC: 143 MMOL/L — SIGNIFICANT CHANGE UP (ref 135–145)
WBC # BLD: 6.52 K/UL — SIGNIFICANT CHANGE UP (ref 3.8–10.5)
WBC # FLD AUTO: 6.52 K/UL — SIGNIFICANT CHANGE UP (ref 3.8–10.5)

## 2025-02-06 PROCEDURE — 99285 EMERGENCY DEPT VISIT HI MDM: CPT

## 2025-02-06 PROCEDURE — 73552 X-RAY EXAM OF FEMUR 2/>: CPT | Mod: 26,RT

## 2025-02-06 PROCEDURE — 73502 X-RAY EXAM HIP UNI 2-3 VIEWS: CPT | Mod: 26,RT

## 2025-02-06 PROCEDURE — 99221 1ST HOSP IP/OBS SF/LOW 40: CPT

## 2025-02-06 PROCEDURE — 72190 X-RAY EXAM OF PELVIS: CPT | Mod: 26,XE

## 2025-02-06 PROCEDURE — 71045 X-RAY EXAM CHEST 1 VIEW: CPT | Mod: 26

## 2025-02-06 PROCEDURE — 93010 ELECTROCARDIOGRAM REPORT: CPT

## 2025-02-06 RX ORDER — AMLODIPINE BESYLATE 5 MG
1 TABLET ORAL
Refills: 0 | DISCHARGE

## 2025-02-06 RX ORDER — POVIDONE-IODINE 0.01 ML/1
1 SWAB TOPICAL ONCE
Refills: 0 | Status: COMPLETED | OUTPATIENT
Start: 2025-02-06 | End: 2025-02-07

## 2025-02-06 RX ORDER — ENOXAPARIN SODIUM 100 MG/ML
40 INJECTION SUBCUTANEOUS ONCE
Refills: 0 | Status: COMPLETED | OUTPATIENT
Start: 2025-02-06 | End: 2025-02-06

## 2025-02-06 RX ORDER — OXYCODONE HYDROCHLORIDE 30 MG/1
5 TABLET ORAL EVERY 4 HOURS
Refills: 0 | Status: DISCONTINUED | OUTPATIENT
Start: 2025-02-06 | End: 2025-02-07

## 2025-02-06 RX ORDER — CETIRIZINE HCL 10 MG
1 TABLET ORAL
Refills: 0 | DISCHARGE

## 2025-02-06 RX ORDER — OXYCODONE HYDROCHLORIDE 30 MG/1
10 TABLET ORAL EVERY 4 HOURS
Refills: 0 | Status: DISCONTINUED | OUTPATIENT
Start: 2025-02-06 | End: 2025-02-07

## 2025-02-06 RX ORDER — BACTERIOSTATIC SODIUM CHLORIDE 0.9 %
1000 VIAL (ML) INJECTION
Refills: 0 | Status: DISCONTINUED | OUTPATIENT
Start: 2025-02-06 | End: 2025-02-11

## 2025-02-06 RX ORDER — TRAMADOL HYDROCHLORIDE 100 MG/1
50 TABLET, EXTENDED RELEASE ORAL EVERY 4 HOURS
Refills: 0 | Status: DISCONTINUED | OUTPATIENT
Start: 2025-02-06 | End: 2025-02-07

## 2025-02-06 RX ORDER — ATORVASTATIN CALCIUM 80 MG/1
1 TABLET, FILM COATED ORAL
Refills: 0 | DISCHARGE

## 2025-02-06 RX ORDER — VANCOMYCIN HYDROCHLORIDE 50 MG/ML
1000 KIT ORAL ONCE
Refills: 0 | Status: COMPLETED | OUTPATIENT
Start: 2025-02-07 | End: 2025-02-07

## 2025-02-06 RX ORDER — ANTISEPTIC SURGICAL SCRUB 0.04 MG/ML
1 SOLUTION TOPICAL ONCE
Refills: 0 | Status: DISCONTINUED | OUTPATIENT
Start: 2025-02-06 | End: 2025-02-11

## 2025-02-06 RX ADMIN — ENOXAPARIN SODIUM 40 MILLIGRAM(S): 100 INJECTION SUBCUTANEOUS at 23:47

## 2025-02-06 NOTE — ED ADULT TRIAGE NOTE - WEIGHT METHOD
Per provider patient given IM medications. Per Bentley HANSEN patient can be discharged. Patient discharged via wheelchair with    
stated

## 2025-02-06 NOTE — PATIENT PROFILE ADULT - FALL HARM RISK - HARM RISK INTERVENTIONS
Assistance with ambulation/Assistance OOB with selected safe patient handling equipment/Communicate Risk of Fall with Harm to all staff/Discuss with provider need for PT consult/Monitor gait and stability/Reinforce activity limits and safety measures with patient and family/Tailored Fall Risk Interventions/Visual Cue: Yellow wristband and red socks/Bed in lowest position, wheels locked, appropriate side rails in place/Call bell, personal items and telephone in reach/Instruct patient to call for assistance before getting out of bed or chair/Non-slip footwear when patient is out of bed/[place] to call system/Physically safe environment - no spills, clutter or unnecessary equipment/Purposeful Proactive Rounding/Room/bathroom lighting operational, light cord in reach

## 2025-02-06 NOTE — ED PROVIDER NOTE - PHYSICAL EXAMINATION
CONSTITUTIONAL: A&O x3, NAD, resting comfortably, well groomed  HEAD: Normocephalic, atraumatic. Scalp without lesions or tenderness.  NECK:  Airway patent. Neck Supple.   CARDIAC: RRR, normal S1/2, no murmurs, rubs, gallops. CW non-TTP, no CW deformity.  RESPIRATORY: CTA B/L, good aeration. No wheezing, rales, adventitious breath sounds. No accessory muscle use.    ABDOMEN: soft, non-distended; non-TTP, No RUQ/RLQ/LUQ/LLQ pain, no rebound tenderness or guarding,  BS + x4 quadrants, no pulsating masses, scars. No CVA tenderness.  PERIPHERAL VASCULAR: No edema of feet and ankles. No varicosities. No discoloration, pigmentation changes, ulcers or lesions. No calf tenderness. Pulses 2+ B/L.   MSK: Moving all extremities.  Full ROM and Strength 5+/5+ B/L upper and lower extremities. Soft compartments B/LLE. + right lateral hip TTP. No C/T/L spinal or paraspinal tenderness.   SKIN: Warm, dry, color WNL, no turgor, erythema or rashes. Cap refill < 2 sec.  NEURO: A&Ox3, interactive, cooperative, no focal deficits.

## 2025-02-06 NOTE — H&P ADULT - HISTORY OF PRESENT ILLNESS
Orthopaedic Surgery History and Physical    Patient is a 84y old  Male who presents with a chief complaint of right hip pain  HPI: Patient initially fell two days ago onto his right side. Denies HS/LOC. Denies numbness/tingling. Patient at baseline is able to ambulate without assistive devices. After the injury, patient had right groin pain but was able to ambulate without assistive devices yesterday and today. Patient presented to an urgent care as family wanted him to be checked out after his fall. Patient states that at this time, he has minimal pain. Denies any numbness or tingling. Denies any other pain or other concerns. Patient was found to have right femoral neck fracture on urgent care xray and was sent in to the hospital for further evaluation.      PAST MEDICAL & SURGICAL HISTORY:  HTN (hypertension)      HLD (hyperlipidemia)      BPH (benign prostatic hyperplasia)        [] No significant past history as reviewed with the patient and family    FAMILY HISTORY:    [] Family history not pertinent as reviewed with the patient and family    SOCIAL HISTORY:    MEDICATIONS  (STANDING):    MEDICATIONS  (PRN):    Allergies    Allergy Status Unknown    Intolerances        REVIEW OF SYSTEMS  ROS as noted in HPI.    Vital Signs Last 24 Hrs  T(C): 36.7 (06 Feb 2025 20:40), Max: 36.8 (06 Feb 2025 20:11)  T(F): 98 (06 Feb 2025 20:40), Max: 98.2 (06 Feb 2025 20:11)  HR: 86 (06 Feb 2025 20:40) (85 - 95)  BP: 147/75 (06 Feb 2025 20:40) (147/75 - 156/76)  BP(mean): --  RR: 14 (06 Feb 2025 20:11) (14 - 18)  SpO2: 95% (06 Feb 2025 20:40) (95% - 98%)    Parameters below as of 06 Feb 2025 20:40  Patient On (Oxygen Delivery Method): room air          PHYSICAL EXAM:  Gen: NAD    Right Lower Extremity:  Skin intact  Positive log roll, negative axial load  Able to SLR  Soft compartments  Negative calf ttp  +EHL/FHL/TA/GSc  SILT SPN, DPN, Tib, Saph, Lnare  DP+     Secondary Survey:   LLE/RUE/LUE: No TTP over bony prominences, SILT, palpable pulses, full/painless range of motion, compartments soft    Spine: No bony tenderness. No palpable stepoffs.                             12.7   6.52  )-----------( 191      ( 06 Feb 2025 17:41 )             37.5     02-06    143  |  111[H]  |  19  ----------------------------<  124[H]  4.9   |  26  |  1.10    Ca    8.9      06 Feb 2025 17:41      PT/INR - ( 06 Feb 2025 17:41 )   PT: 11.6 sec;   INR: 1.03 ratio         PTT - ( 06 Feb 2025 17:41 )  PTT:32.1 sec  Urinalysis Basic - ( 06 Feb 2025 17:41 )    Color: x / Appearance: x / SG: x / pH: x  Gluc: 124 mg/dL / Ketone: x  / Bili: x / Urobili: x   Blood: x / Protein: x / Nitrite: x   Leuk Esterase: x / RBC: x / WBC x   Sq Epi: x / Non Sq Epi: x / Bacteria: x        IMAGING STUDIES:    ASSESSMENT  84y Male with right femoral neck fracture    PLAN  - Admit to orthopaedic surgery for hemiarthroplasty vs total hip replacement  - Preop labs AM  - NPO @ midnight  - NWB RLE  - DVT ppx - one dose of lovenox tonight, SCDs, hold chemical DVT ppx at midnight  - Please document medical optimization for surgery prior to procedure  - Appreciate medical comanagement    Discussed with attending orthopaedic surgeon, Dr. Issa

## 2025-02-06 NOTE — ED ADULT TRIAGE NOTE - NSTRIAGECARE_GEN_A_ER
Patient Name: Caden Rush  Procedure Date: 8/1/2018 12:34 PM  MRN: [id number]  Account Number: [id number]  YOB: 1949  Admit Type: Inpatient  Age: 68  Gender: Male  Attending MD: Casey Leyva MD  Grafts or Implants: None  Blood Administered: None  Procedure:            Upper GI endoscopy  Indications:          Melena  Providers:            Casey Leyva MD  Attending Participation:       I personally performed the entire procedure.  Sedation:             Monitored Anesthesia Care  Procedure:       A History and Physical was performed prior to the procedure. Patient       medications and allergies were reviewed. Informed consent was obtained       from the patient including discussion of the risks, benefits,       alternatives to the procedure (including the potentially life       threatening risk of bleeding, perforation, missed lesions and sedation).       Questions were answered. A time out was completed. Patient       identification and proposed procedure were verified. The patient was       deemed in satisfactory condition to undergo the procedure. The heart       rate, respiratory rate, oxygen saturations, blood pressure and response       to sedation were monitored throughout the procedure.       The endoscope was passed under direct vision. The Endoscope was       introduced through the mouth, and advanced to the second part of       duodenum. The scope was withdrawn and retroflexion was performed in the       fundus. The physical status of the patient was re-assessed after the       procedure. The upper GI endoscopy was accomplished without difficulty.       The patient tolerated the procedure well.  Scope In: 1:01:16 PM  Scope Out: 1:14:12 PM  Findings:       The examined esophagus was normal.       Diffuse moderate inflammation characterized by erythema was found in the       gastric antrum.       Patchy moderately erythematous mucosa was found in the duodenal bulb.       A single 3 mm  sessile polyp with no bleeding was found in the second       portion of the duodenum.  Impression:       - Normal esophagus.       - Gastritis.       - Erythematous duodenopathy.       - A single duodenal polyp.       - No specimens collected.  Recommendation:       - Return patient to hospital bower for ongoing care.  Complications:        No immediate complications.  Estimated Blood Loss: Estimated blood loss: none.  MD Casey Murcia MD  8/1/2018 1:19:40 PM  This report has been signed electronically by the above.  Number of Addenda: 0       [id number] [place] [address]     Face Mask

## 2025-02-06 NOTE — ED PROVIDER NOTE - ATTENDING APP SHARED VISIT CONTRIBUTION OF CARE
84M pmhx htn, hld, bph presenting for eval of nondisplaced fx of right femoral head after fall 2 days ago without head injury or LOC. Pt states he has been ambulatory but with some pain. Denies chest pain, sob, fever, abd pain or new complaints  agree with physical exam above  plan - pre-op labs, repeat hip xray, ortho consult for admission

## 2025-02-06 NOTE — ED PROVIDER NOTE - CLINICAL SUMMARY MEDICAL DECISION MAKING FREE TEXT BOX
DASHA Rodriguez NP Fellow: Patient is 83yo M PMHx HTN, HLD, BPH, presents with c/o referred to ER by MD Giron from [facility] for confirmed acute nondisplaced impacted fracture of the right femoral neck found on xrays today. patient endorses trip and fall two days ago landing on right hand and right hip, denies head strike or LOC, denies blood thinners. Patient has been ambulating with intermittent right hip pain. Patient denies other complaints.  Patient well appearing, nontoxic, and in no apparent distress. Physical exam as above, pertinent for right lateral hip TTP. No C/T/L spinal or paraspinal pain. Heart RRR. Lungs CTA. No focal deficits.   Patient likely with right hip fracture to be admitted to orthopedic service for possible surgery tonight or tomorrow. Ortho team paged, will come to see ER patients, pre op ordered placed per ortho request, repeat pelvix, hip, femur xrays ordered 2/2 unable to see in HIE.  Patient to be admitted to hospital for right hip surgery. Patient offered at declined pain medication.   Daughter at bedside contributing to history and shared decision making.

## 2025-02-06 NOTE — ED ADULT TRIAGE NOTE - CHIEF COMPLAINT QUOTE
Patient sent from [facility] G[facility] for preoperative workup for R femoral head fracture sustained after mechanical fall on Tuesday,   Patient reports ambulating on leg within Tuesday and today, No shortening or rotation noted in wheelchair.   PMH: HTN, high cholesterol.

## 2025-02-06 NOTE — H&P ADULT - ATTENDING COMMENTS
84 male with right hip pain after mechanical fall 2 days ago. Pain with ambulation. No antecedent pain. Patient baseline walks without assistance and ambulates in community and exercises. Does PT twice weekly for knee OA.   Xrays show right femoral neck fracture with mild displacement  RLE:  motor/sensory intact  pain with logroll  palp DP    Indicated for right hip arthroplasty  risks/benefits/alternatives discussed  Informed consent signed

## 2025-02-06 NOTE — ED ADULT NURSE NOTE - NS ED NOTE ABUSE SUSPICION NEGLECT YN
-- DO NOT REPLY / DO NOT REPLY ALL --  -- Message is from the [facility]--    General Patient Message      Reason for Call: Kenzie is calling back regarding the missed call and did not want to leave a direct message as he states he is frustrated with the process, states it is a very important reason and he would like a call back from someone directly in the office of Dr. Farhana Chow.. Please call back     Caller Information       Type Contact Phone    09/06/2021 10:51 AM CDT Phone (Incoming) Nguyen Kenzie (Self) [phone] (H)          Alternative phone number: none     Turnaround time given to caller:   \"This message will be sent to [state Provider's name]. The clinical team will fulfill your request as soon as they review your message.\"     No

## 2025-02-06 NOTE — ED ADULT NURSE NOTE - NSFALLHARMRISKINTERV_ED_ALL_ED

## 2025-02-06 NOTE — CONSULT NOTE ADULT - ASSESSMENT
Gabino Avitia is an 84 year old male with PMHx of HTN, HLD, and BPH who presented to the ED on 2/6/25 for complaints of abnormal imaging and admitted for right femoral neck fracture.    R. femoral neck fracture s/p mechanical fall  Reports he fell inside his house on Tuesday afternoon, landed on right hand and right hip, denies head hit  Iced area for 30 minutes and took an Aleve  Did not think much of it as able to ambulate unassisted   Underwent outpatient R. hip x-ray today which revealed R. femoral neck fracture  Plan for OR for cornel vs. total hip replacement as per primary ortho team  DVT ppx and pain management as per primary ortho team  PT/OT postop    Medical optimization  Procedure is considered intermediate risk  RCRI 0 which is 0.4% risk of cardiac death, nonfatal MI, and nonfatal cardiac arrest  DASI > 4 METs  No absolute contraindication for procedure      Chronic medical conditions:  HTN: recommend continuing PTA amlodipine  HLD: recommend continuing PTA atorvastatin  BPH: recommend continuing PTA tamsulosin Gabino Avitia is an 84 year old male with PMHx of HTN, HLD, and BPH who presented to the ED on 2/6/25 for complaints of abnormal imaging and admitted for right femoral neck fracture.    R. femoral neck fracture s/p mechanical fall  Reports he fell inside his house on Tuesday afternoon, landed on right hand and right hip, denies head hit  Iced area for 30 minutes and took an Aleve  Did not think much of it as able to ambulate unassisted   Underwent outpatient R. hip x-ray today which revealed R. femoral neck fracture  Plan for OR for cornel vs. total hip replacement as per primary ortho team  DVT ppx and pain management as per primary ortho team  PT/OT postop    Medical optimization  Procedure is considered intermediate risk  RCRI 0 which is class I, 0.4% risk of cardiac death, nonfatal MI, and nonfatal cardiac arrest in patient undergoing noncardiac surgery  DASI > 4 METs  No absolute contraindication for procedure      Chronic medical conditions:  HTN: recommend continuing PTA amlodipine  HLD: recommend continuing PTA atorvastatin  BPH: recommend continuing PTA tamsulosin

## 2025-02-06 NOTE — ED ADULT NURSE NOTE - NS PRO PASSIVE SMOKE EXP
See microscopic polyangitis    Patient w/o CKD presenting with WILFREDO with rapidly increasing sCr (baseline sCr 1.0-1.2). New onset proteinuria/hematuria in last 30 days. .  DDx: ATN vs GN.     Serum creatinine: 6.1 mg/dL (H) 10/27/22 0418  Estimated creatinine clearance: 6.8 mL/min (A)    -- F/U serologies per Nephro (MITUL, ANCA, anti-GBM, IgA, C3, C4, HCV, HBV, cryo, RF)  -- Renal Bx done today  -- IV Solumedrol 1mg x3 days completed. Now continuing lower dose IV solumedrol in setting of pt's intolerance for PO meds. Will transition to PO pred once able.  -- Trend sCr  -- Strict I&Os  -- Avoid nephrotoxic agents  -- Renally adjust medications  -- added bicarb today  -- Nephro and Rheumatology following  -- Discussing HD w/ nephro     No

## 2025-02-06 NOTE — CONSULT NOTE ADULT - SUBJECTIVE AND OBJECTIVE BOX
Gabino Avitia is an 84 year old male with PMHx of HTN, HLD, and BPH who presented to the ED on 2/6/25 for complaints of abnormal imaging.     Patient reports he fell inside his house on Tuesday afternoon. Landed on his right hand and right hip. Denies head hit. Able to get up off the floor and did not think much of the fall. Thought he was just clumsy and fell down. Proceeded to let his dog outside and then sat down in his recliner and put ice on his right hip for 30 minutes. Able to ambulate afterwards. States he felt uncomfortable but denies pain. Took Aleve. This morning, he was in no pain and able to ambulate. His daughter comes over a few times a day to check on him since he lives alone and informed him that he should have gotten an x-ray when he initially fell. His son-in-law who is a physician wrote him a script for a hip x-ray which he underwent as an outpatient. Then received a call later this afternoon to inform him to stay in his chair and to not ambulate. Brought to the ER for further evaluation. Baseline functional status is ambulates unassisted and independent with all ADLs. Lives at home alone with dog and parrot.    In the ED, VSS. Labs grossly unremarkable except hgb 12.7. CXR unremarkable. Did not receive any medications. Admitted to orthopedic surgery service. Medicine consulted for medical optimization.    Vital signs:  T(C): 36.7 (02-06-25 @ 20:40), Max: 36.8 (02-06-25 @ 20:11)  HR: 86 (02-06-25 @ 20:40) (85 - 95)  BP: 147/75 (02-06-25 @ 20:40) (147/75 - 156/76)  RR: 14 (02-06-25 @ 20:11) (14 - 18)  SpO2: 95% (02-06-25 @ 20:40) (95% - 98%)    Physical Exam:  CONSTITUTIONAL: Well groomed, no apparent distress, pleasant, conversational  EYES: PERRLA and symmetric, EOMI  ENMT: Oral mucosa with moist membranes  RESP: No respiratory distress, no use of accessory muscles; CTA b/l  CV: RRR  GI: Soft, NT, ND  MSK: R. hip without obvious ecchymosis, nontender to palpation, full AROM and PROM    Labs:                        12.7   6.52  )-----------( 191      ( 06 Feb 2025 17:41 )             37.5     143  |  111[H]  |  19  ----------------------------<  124[H]     02-06  4.9   |  26  |  1.10    Ca    8.9      06 Feb 2025 17:41    PT/INR: 11.6/1.03 (02-06-25 @ 17:41)  PTT: 32.1 (02-06-25 @ 17:41)    Imaging:  Chest x-ray 2/6/25  IMPRESSION:  No acute cardiopulmonary disease process.    Pelvis x-ray 2/6/25  R. hip x-ray 2/6/25  R. femur x-ray 2/6/25

## 2025-02-06 NOTE — ED ADULT NURSE NOTE - OBJECTIVE STATEMENT
Patient is an 84 years old male, alert & oriented x 3 sent here by Dr. Mack Lazar for acute non-displaced fracture of R femoral neck s/p fall 2 days ago. (+) Mild pain to R hip. PMHX: HTN, HLD, BPH, Back Surgery.

## 2025-02-07 ENCOUNTER — TRANSCRIPTION ENCOUNTER (OUTPATIENT)
Age: 85
End: 2025-02-07

## 2025-02-07 ENCOUNTER — APPOINTMENT (OUTPATIENT)
Dept: ORTHOPEDIC SURGERY | Facility: HOSPITAL | Age: 85
End: 2025-02-07

## 2025-02-07 LAB
ANION GAP SERPL CALC-SCNC: 5 MMOL/L — SIGNIFICANT CHANGE UP (ref 5–17)
ANION GAP SERPL CALC-SCNC: 5 MMOL/L — SIGNIFICANT CHANGE UP (ref 5–17)
APTT BLD: 35.9 SEC — HIGH (ref 24.5–35.6)
BUN SERPL-MCNC: 11 MG/DL — SIGNIFICANT CHANGE UP (ref 7–23)
BUN SERPL-MCNC: 16 MG/DL — SIGNIFICANT CHANGE UP (ref 7–23)
CALCIUM SERPL-MCNC: 7.9 MG/DL — LOW (ref 8.5–10.1)
CALCIUM SERPL-MCNC: 8.2 MG/DL — LOW (ref 8.5–10.1)
CHLORIDE SERPL-SCNC: 110 MMOL/L — HIGH (ref 96–108)
CHLORIDE SERPL-SCNC: 112 MMOL/L — HIGH (ref 96–108)
CO2 SERPL-SCNC: 23 MMOL/L — SIGNIFICANT CHANGE UP (ref 22–31)
CO2 SERPL-SCNC: 25 MMOL/L — SIGNIFICANT CHANGE UP (ref 22–31)
CREAT SERPL-MCNC: 0.8 MG/DL — SIGNIFICANT CHANGE UP (ref 0.5–1.3)
CREAT SERPL-MCNC: 0.85 MG/DL — SIGNIFICANT CHANGE UP (ref 0.5–1.3)
EGFR: 86 ML/MIN/1.73M2 — SIGNIFICANT CHANGE UP
EGFR: 88 ML/MIN/1.73M2 — SIGNIFICANT CHANGE UP
GLUCOSE SERPL-MCNC: 123 MG/DL — HIGH (ref 70–99)
GLUCOSE SERPL-MCNC: 97 MG/DL — SIGNIFICANT CHANGE UP (ref 70–99)
HCT VFR BLD CALC: 33.3 % — LOW (ref 39–50)
HCT VFR BLD CALC: 35.8 % — LOW (ref 39–50)
HGB BLD-MCNC: 11.5 G/DL — LOW (ref 13–17)
HGB BLD-MCNC: 12.2 G/DL — LOW (ref 13–17)
INR BLD: 1.08 RATIO — SIGNIFICANT CHANGE UP (ref 0.85–1.16)
MCHC RBC-ENTMCNC: 32.7 PG — SIGNIFICANT CHANGE UP (ref 27–34)
MCHC RBC-ENTMCNC: 33.1 PG — SIGNIFICANT CHANGE UP (ref 27–34)
MCHC RBC-ENTMCNC: 34.1 G/DL — SIGNIFICANT CHANGE UP (ref 32–36)
MCHC RBC-ENTMCNC: 34.5 G/DL — SIGNIFICANT CHANGE UP (ref 32–36)
MCV RBC AUTO: 94.6 FL — SIGNIFICANT CHANGE UP (ref 80–100)
MCV RBC AUTO: 97 FL — SIGNIFICANT CHANGE UP (ref 80–100)
NRBC # BLD: 0 /100 WBCS — SIGNIFICANT CHANGE UP (ref 0–0)
NRBC # BLD: 0 /100 WBCS — SIGNIFICANT CHANGE UP (ref 0–0)
NRBC BLD-RTO: 0 /100 WBCS — SIGNIFICANT CHANGE UP (ref 0–0)
NRBC BLD-RTO: 0 /100 WBCS — SIGNIFICANT CHANGE UP (ref 0–0)
PLATELET # BLD AUTO: 162 K/UL — SIGNIFICANT CHANGE UP (ref 150–400)
PLATELET # BLD AUTO: 174 K/UL — SIGNIFICANT CHANGE UP (ref 150–400)
POTASSIUM SERPL-MCNC: 3.7 MMOL/L — SIGNIFICANT CHANGE UP (ref 3.5–5.3)
POTASSIUM SERPL-MCNC: 4.2 MMOL/L — SIGNIFICANT CHANGE UP (ref 3.5–5.3)
POTASSIUM SERPL-SCNC: 3.7 MMOL/L — SIGNIFICANT CHANGE UP (ref 3.5–5.3)
POTASSIUM SERPL-SCNC: 4.2 MMOL/L — SIGNIFICANT CHANGE UP (ref 3.5–5.3)
PROTHROM AB SERPL-ACNC: 12.2 SEC — SIGNIFICANT CHANGE UP (ref 9.9–13.4)
RBC # BLD: 3.52 M/UL — LOW (ref 4.2–5.8)
RBC # BLD: 3.69 M/UL — LOW (ref 4.2–5.8)
RBC # FLD: 13.1 % — SIGNIFICANT CHANGE UP (ref 10.3–14.5)
RBC # FLD: 13.2 % — SIGNIFICANT CHANGE UP (ref 10.3–14.5)
SODIUM SERPL-SCNC: 138 MMOL/L — SIGNIFICANT CHANGE UP (ref 135–145)
SODIUM SERPL-SCNC: 142 MMOL/L — SIGNIFICANT CHANGE UP (ref 135–145)
WBC # BLD: 5.19 K/UL — SIGNIFICANT CHANGE UP (ref 3.8–10.5)
WBC # BLD: 6.98 K/UL — SIGNIFICANT CHANGE UP (ref 3.8–10.5)
WBC # FLD AUTO: 5.19 K/UL — SIGNIFICANT CHANGE UP (ref 3.8–10.5)
WBC # FLD AUTO: 6.98 K/UL — SIGNIFICANT CHANGE UP (ref 3.8–10.5)

## 2025-02-07 PROCEDURE — 27130 TOTAL HIP ARTHROPLASTY: CPT | Mod: RT

## 2025-02-07 PROCEDURE — 99223 1ST HOSP IP/OBS HIGH 75: CPT | Mod: 57

## 2025-02-07 PROCEDURE — 73502 X-RAY EXAM HIP UNI 2-3 VIEWS: CPT | Mod: 26,RT

## 2025-02-07 PROCEDURE — 99232 SBSQ HOSP IP/OBS MODERATE 35: CPT

## 2025-02-07 DEVICE — SCREW HEX LO PROF 6.5X25MM: Type: IMPLANTABLE DEVICE | Site: RIGHT | Status: FUNCTIONAL

## 2025-02-07 DEVICE — STEM FEM INSIGNIA COLLARED HIGH SZ 6 38.5X107MM: Type: IMPLANTABLE DEVICE | Site: RIGHT | Status: FUNCTIONAL

## 2025-02-07 DEVICE — HEAD FEM 36MM -5MM: Type: IMPLANTABLE DEVICE | Site: RIGHT | Status: FUNCTIONAL

## 2025-02-07 DEVICE — SHELL ACET TRIDENT II E 52MM: Type: IMPLANTABLE DEVICE | Site: RIGHT | Status: FUNCTIONAL

## 2025-02-07 DEVICE — LINER ACET TRIDENT X3 0 DEG 36MM E: Type: IMPLANTABLE DEVICE | Site: RIGHT | Status: FUNCTIONAL

## 2025-02-07 RX ORDER — OXYCODONE HYDROCHLORIDE 30 MG/1
5 TABLET ORAL
Refills: 0 | Status: DISCONTINUED | OUTPATIENT
Start: 2025-02-07 | End: 2025-02-11

## 2025-02-07 RX ORDER — ATORVASTATIN CALCIUM 80 MG/1
10 TABLET, FILM COATED ORAL AT BEDTIME
Refills: 0 | Status: DISCONTINUED | OUTPATIENT
Start: 2025-02-07 | End: 2025-02-11

## 2025-02-07 RX ORDER — SODIUM CHLORIDE 9 G/ML
1000 INJECTION, SOLUTION INTRAVENOUS
Refills: 0 | Status: DISCONTINUED | OUTPATIENT
Start: 2025-02-07 | End: 2025-02-07

## 2025-02-07 RX ORDER — MECOBAL/LEVOMEFOLAT CA/B6 PHOS 2-3-35 MG
1 TABLET ORAL DAILY
Refills: 0 | Status: DISCONTINUED | OUTPATIENT
Start: 2025-02-07 | End: 2025-02-11

## 2025-02-07 RX ORDER — TAMSULOSIN HYDROCHLORIDE 0.4 MG/1
0.4 CAPSULE ORAL
Refills: 0 | Status: DISCONTINUED | OUTPATIENT
Start: 2025-02-07 | End: 2025-02-11

## 2025-02-07 RX ORDER — ASPIRIN 81 MG/1
81 TABLET, COATED ORAL EVERY 12 HOURS
Refills: 0 | Status: DISCONTINUED | OUTPATIENT
Start: 2025-02-08 | End: 2025-02-11

## 2025-02-07 RX ORDER — CLINDAMYCIN HYDROCHLORIDE 150 MG/1
900 CAPSULE ORAL EVERY 8 HOURS
Refills: 0 | Status: COMPLETED | OUTPATIENT
Start: 2025-02-07 | End: 2025-02-08

## 2025-02-07 RX ORDER — POLYETHYLENE GLYCOL 3350 17 G/17G
17 POWDER, FOR SOLUTION ORAL AT BEDTIME
Refills: 0 | Status: DISCONTINUED | OUTPATIENT
Start: 2025-02-07 | End: 2025-02-11

## 2025-02-07 RX ORDER — MAGNESIUM, ALUMINUM HYDROXIDE 200-225/5
30 SUSPENSION, ORAL (FINAL DOSE FORM) ORAL
Refills: 0 | Status: DISCONTINUED | OUTPATIENT
Start: 2025-02-07 | End: 2025-02-11

## 2025-02-07 RX ORDER — TRAMADOL HYDROCHLORIDE 100 MG/1
25 TABLET, EXTENDED RELEASE ORAL EVERY 6 HOURS
Refills: 0 | Status: DISCONTINUED | OUTPATIENT
Start: 2025-02-07 | End: 2025-02-11

## 2025-02-07 RX ORDER — AMLODIPINE BESYLATE 5 MG
10 TABLET ORAL AT BEDTIME
Refills: 0 | Status: DISCONTINUED | OUTPATIENT
Start: 2025-02-07 | End: 2025-02-11

## 2025-02-07 RX ORDER — ONDANSETRON 4 MG/1
4 TABLET, ORALLY DISINTEGRATING ORAL EVERY 6 HOURS
Refills: 0 | Status: DISCONTINUED | OUTPATIENT
Start: 2025-02-07 | End: 2025-02-11

## 2025-02-07 RX ORDER — ACETAMINOPHEN 160 MG/5ML
1000 SUSPENSION ORAL EVERY 8 HOURS
Refills: 0 | Status: DISCONTINUED | OUTPATIENT
Start: 2025-02-07 | End: 2025-02-11

## 2025-02-07 RX ORDER — OXYCODONE HYDROCHLORIDE 30 MG/1
2.5 TABLET ORAL
Refills: 0 | Status: DISCONTINUED | OUTPATIENT
Start: 2025-02-07 | End: 2025-02-11

## 2025-02-07 RX ORDER — SENNOSIDES 8.6 MG
2 TABLET ORAL AT BEDTIME
Refills: 0 | Status: DISCONTINUED | OUTPATIENT
Start: 2025-02-07 | End: 2025-02-11

## 2025-02-07 RX ORDER — OXYBUTYNIN CHLORIDE 5 MG/1
5 TABLET, EXTENDED RELEASE ORAL
Refills: 0 | Status: DISCONTINUED | OUTPATIENT
Start: 2025-02-07 | End: 2025-02-11

## 2025-02-07 RX ORDER — HYDROMORPHONE HYDROCHLORIDE 4 MG/ML
1 INJECTION, SOLUTION INTRAMUSCULAR; INTRAVENOUS; SUBCUTANEOUS
Refills: 0 | Status: DISCONTINUED | OUTPATIENT
Start: 2025-02-07 | End: 2025-02-07

## 2025-02-07 RX ORDER — MONTELUKAST SODIUM 5 MG/1
10 TABLET, CHEWABLE ORAL AT BEDTIME
Refills: 0 | Status: DISCONTINUED | OUTPATIENT
Start: 2025-02-07 | End: 2025-02-11

## 2025-02-07 RX ORDER — HYDROMORPHONE HYDROCHLORIDE 4 MG/ML
0.5 INJECTION, SOLUTION INTRAMUSCULAR; INTRAVENOUS; SUBCUTANEOUS
Refills: 0 | Status: DISCONTINUED | OUTPATIENT
Start: 2025-02-07 | End: 2025-02-07

## 2025-02-07 RX ORDER — AMLODIPINE BESYLATE 5 MG
5 TABLET ORAL DAILY
Refills: 0 | Status: DISCONTINUED | OUTPATIENT
Start: 2025-02-07 | End: 2025-02-11

## 2025-02-07 RX ORDER — PANTOPRAZOLE 20 MG/1
40 TABLET, DELAYED RELEASE ORAL
Refills: 0 | Status: DISCONTINUED | OUTPATIENT
Start: 2025-02-07 | End: 2025-02-11

## 2025-02-07 RX ORDER — CETIRIZINE HCL 10 MG
5 TABLET ORAL DAILY
Refills: 0 | Status: DISCONTINUED | OUTPATIENT
Start: 2025-02-07 | End: 2025-02-11

## 2025-02-07 RX ORDER — MAGNESIUM HYDROXIDE 400 MG/5ML
30 SUSPENSION, ORAL (FINAL DOSE FORM) ORAL DAILY
Refills: 0 | Status: DISCONTINUED | OUTPATIENT
Start: 2025-02-07 | End: 2025-02-11

## 2025-02-07 RX ORDER — BACTERIOSTATIC SODIUM CHLORIDE 0.9 %
1000 VIAL (ML) INJECTION
Refills: 0 | Status: DISCONTINUED | OUTPATIENT
Start: 2025-02-07 | End: 2025-02-10

## 2025-02-07 RX ADMIN — ACETAMINOPHEN 1000 MILLIGRAM(S): 160 SUSPENSION ORAL at 22:14

## 2025-02-07 RX ADMIN — POLYETHYLENE GLYCOL 3350 17 GRAM(S): 17 POWDER, FOR SOLUTION ORAL at 21:16

## 2025-02-07 RX ADMIN — Medication 10 MILLIGRAM(S): at 21:15

## 2025-02-07 RX ADMIN — POVIDONE-IODINE 1 APPLICATION(S): 0.01 SWAB TOPICAL at 07:40

## 2025-02-07 RX ADMIN — TAMSULOSIN HYDROCHLORIDE 0.4 MILLIGRAM(S): 0.4 CAPSULE ORAL at 21:15

## 2025-02-07 RX ADMIN — Medication 2 TABLET(S): at 21:14

## 2025-02-07 RX ADMIN — VANCOMYCIN HYDROCHLORIDE 250 MILLIGRAM(S): KIT at 15:53

## 2025-02-07 RX ADMIN — SODIUM CHLORIDE 75 MILLILITER(S): 9 INJECTION, SOLUTION INTRAVENOUS at 19:35

## 2025-02-07 RX ADMIN — CLINDAMYCIN HYDROCHLORIDE 100 MILLIGRAM(S): 150 CAPSULE ORAL at 21:17

## 2025-02-07 RX ADMIN — MONTELUKAST SODIUM 10 MILLIGRAM(S): 5 TABLET, CHEWABLE ORAL at 21:15

## 2025-02-07 RX ADMIN — ACETAMINOPHEN 1000 MILLIGRAM(S): 160 SUSPENSION ORAL at 21:14

## 2025-02-07 RX ADMIN — ATORVASTATIN CALCIUM 10 MILLIGRAM(S): 80 TABLET, FILM COATED ORAL at 21:15

## 2025-02-07 NOTE — DISCHARGE NOTE PROVIDER - HOSPITAL COURSE
The patient is a 84y Male status post right total hip arthroplasty. Patient initially presented to [facility] with concern of right femoral neck fracture and was medically optimized for surgery. The patient was taken to the operating room on 2/7/25. Prophylactic antibiotics were started before the procedure and continued for 24 hours. There were no complications during the procedure and patient tolerated the procedure well. The patient was transferred to the recovery room in stable condition and subsequently to the surgical floor. The patient was placed on Aspirin 81 BID for anticoagulation while in house. All home medications were continued. The patient received physical therapy daily and daily labs were followed. The dressing was kept clean, dry, intact. The rest of the hospital stay was unremarkable. The patient is a 84y Male status post right total hip arthroplasty. Patient initially presented to [facility] with concern of right femoral neck fracture and was medically optimized for surgery. The patient was taken to the operating room on 2/7/25. Prophylactic antibiotics were started before the procedure and continued for 24 hours. There were no complications during the procedure and patient tolerated the procedure well. The patient was transferred to the recovery room in stable condition and subsequently to the surgical floor. The patient was placed on Aspirin 81 BID for anticoagulation while in house. All home medications were continued. The patient received physical therapy daily and daily labs were followed. The dressing was kept clean, dry, intact. The rest of the hospital stay was unremarkable. Patient was stable for discharge to [facility].

## 2025-02-07 NOTE — DISCHARGE NOTE PROVIDER - CARE PROVIDERS DIRECT ADDRESSES
[email or web address]

## 2025-02-07 NOTE — DISCHARGE NOTE PROVIDER - NSDCFUADDINST_GEN_ALL_CORE_FT
Elevate the leg as much as possible ("toes above the nose") to help control swelling while maintaining hip precautions. Make sure you get up and take a brief walk every two hours to help with circulation and prevent stiffness. Incentive spirometer 10X/hour. Ice to help with pain/inflammation (20 mins on, 20 mins off)    Anterior Hip Precautions: Maintain precautions recommended by physical therapy.   -DO NOT step backwards with surgical leg. No hip extension. No leaning backwards.   -DO NOT allow surgical leg to externally rotate (turn outwards).  Elevate the leg (while keeping hip precautions) as often as possible to help control swelling.     Keep Aquacel dressing clean/dry/intact. You may remove dressing and replace with dry gauze and tegaderm after 10-14 days after your surgery. You may shower with the Aquacel dressing however avoid direct water beating against the bandage.    If you have any staples, they will be removed on postoperative day 14 by an RN. Please call to schedule an appointment with Dr. Issa in 10-14 days. Make sure to take your medications that were prescribed, especially your DVT prophylaxis medications. See med rec for further detail.

## 2025-02-07 NOTE — DISCHARGE NOTE PROVIDER - CARE PROVIDER_API CALL
Tai Issa  Adult Reconstructive Orthopaedic Surgery  [address], [address]  [place] [address]  Phone: [phone]  Fax: [phone]  Follow Up Time:

## 2025-02-07 NOTE — PROGRESS NOTE ADULT - SUBJECTIVE AND OBJECTIVE BOX
Patient seen and examined at bedside. Patient reports pain well controlled on medications. No acute events overnight. Pt denies fevers, chills, new onset numbness, weakness or tingling in the extremities.    Vital Signs (24 Hrs):  T(C): 37.2 (02-07-25 @ 04:50), Max: 37.2 (02-07-25 @ 04:50)  HR: 95 (02-07-25 @ 04:50) (74 - 95)  BP: 130/74 (02-07-25 @ 04:50) (122/77 - 156/76)  RR: 18 (02-07-25 @ 04:50) (14 - 18)  SpO2: 93% (02-07-25 @ 04:50) (93% - 98%)  Wt(kg): --    LABS:                          11.5   5.19  )-----------( 162      ( 07 Feb 2025 04:10 )             33.3     02-07    142  |  112[H]  |  16  ----------------------------<  97  3.7   |  25  |  0.85    Ca    8.2[L]      07 Feb 2025 04:10        PT/INR - ( 07 Feb 2025 04:10 )   PT: 12.2 sec;   INR: 1.08 ratio         PTT - ( 07 Feb 2025 04:10 )  PTT:35.9 sec    Physical Exam:  Gen: NAD    Right Lower Extremity:  Skin intact  Positive log roll, negative axial load  Able to SLR  Soft compartments  Negative calf ttp  +EHL/FHL/TA/GSc  SILT SPN, DPN, Tib, Saph, Lanre  DP+      ASSESSMENT  84y Male with right femoral neck fracture    PLAN    - Plan for OR today for hemiarthroplasty vs total hip replacement with Dr. Issa  - Preop labs AM  - NPO with IVF  - NWB RLE  - Hold chemical DVT ppx for OR today  - Med clearance documented  - Appreciate medical comanagement  - Discussed with Dr. Issa who agrees with plan

## 2025-02-07 NOTE — PROGRESS NOTE ADULT - SUBJECTIVE AND OBJECTIVE BOX
Patient is a 84y old  Male who presents with a chief complaint of R Femoral Neck Fracture (07 Feb 2025 06:35)    INTERVAL HPI/OVERNIGHT EVENTS:    MEDICATIONS  (STANDING):  chlorhexidine 2% Cloths 1 Application(s) Topical once  sodium chloride 0.9%. 1000 milliLiter(s) (100 mL/Hr) IV Continuous <Continuous>  vancomycin  IVPB 1000 milliGRAM(s) IV Intermittent once    MEDICATIONS  (PRN):  oxyCODONE    IR 10 milliGRAM(s) Oral every 4 hours PRN Severe Pain (7 - 10)  oxyCODONE    IR 5 milliGRAM(s) Oral every 4 hours PRN Moderate Pain (4 - 6)  traMADol 50 milliGRAM(s) Oral every 4 hours PRN Mild Pain (1 - 3)    Allergies    Allergy Status Unknown    Intolerances      REVIEW OF SYSTEMS:  All other systems reviewed and are negative    Vital Signs Last 24 Hrs  T(C): 36.4 (07 Feb 2025 10:48), Max: 37.2 (07 Feb 2025 04:50)  T(F): 97.5 (07 Feb 2025 10:48), Max: 99 (07 Feb 2025 04:50)  HR: 76 (07 Feb 2025 10:48) (74 - 95)  BP: 156/81 (07 Feb 2025 10:48) (122/77 - 156/81)  BP(mean): --  RR: 17 (07 Feb 2025 10:48) (14 - 18)  SpO2: 95% (07 Feb 2025 10:48) (93% - 98%)    Parameters below as of 07 Feb 2025 10:48  Patient On (Oxygen Delivery Method): room air      Daily Height in cm: 177.8 (06 Feb 2025 16:06)    Daily   I&O's Summary    CAPILLARY BLOOD GLUCOSE        PHYSICAL EXAM:  GENERAL: NAD,    HEAD:  Atraumatic, Normocephalic  EYES: EOMI, PERRLA, conjunctiva and sclera clear  ENMT: No tonsillar erythema, exudates, or enlargement; Moist mucous membranes, Good dentition, No lesions  NECK: Supple, No JVD, Normal thyroid  NERVOUS SYSTEM:  Alert & Oriented X3, Good concentration; Motor Strength 5/5 B/L upper and lower extremities; DTRs 2+ intact and symmetric  CHEST/LUNG: Clear to percussion bilaterally; No rales, rhonchi, wheezing, or rubs  HEART: Regular rate and rhythm; No murmurs, rubs, or gallops  ABDOMEN: Soft, Nontender, Nondistended; Bowel sounds present  EXTREMITIES:  2+ Peripheral Pulses, No clubbing, cyanosis, or edema  LYMPH: No lymphadenopathy noted  SKIN: No rashes or lesions    Labs                          11.5   5.19  )-----------( 162      ( 07 Feb 2025 04:10 )             33.3     02-07    142  |  112[H]  |  16  ----------------------------<  97  3.7   |  25  |  0.85    Ca    8.2[L]      07 Feb 2025 04:10      PT/INR - ( 07 Feb 2025 04:10 )   PT: 12.2 sec;   INR: 1.08 ratio         PTT - ( 07 Feb 2025 04:10 )  PTT:35.9 sec      Urinalysis Basic - ( 07 Feb 2025 04:10 )    Color: x / Appearance: x / SG: x / pH: x  Gluc: 97 mg/dL / Ketone: x  / Bili: x / Urobili: x   Blood: x / Protein: x / Nitrite: x   Leuk Esterase: x / RBC: x / WBC x   Sq Epi: x / Non Sq Epi: x / Bacteria: x                  DVT prophylaxis: > Lovenox 40mg SQ daily  > Heparin   > SCD's

## 2025-02-07 NOTE — PROGRESS NOTE ADULT - SUBJECTIVE AND OBJECTIVE BOX
Postop Check    Patient tolerated the procedure well. Patient seen and examined at bedside.  No acute complaints at this time. Pain well controlled. Denies chest pain, shortness of breath, nausea or vomiting.     PE:  Vital Signs Last 24 Hrs  T(C): 36.4 (02-07-25 @ 20:01), Max: 37.2 (02-07-25 @ 04:50)  T(F): 97.5 (02-07-25 @ 20:01), Max: 99 (02-07-25 @ 04:50)  HR: 70 (02-07-25 @ 20:01) (69 - 95)  BP: 142/72 (02-07-25 @ 20:01) (95/64 - 156/81)  BP(mean): 94 (02-07-25 @ 20:01) (72 - 99)  RR: 17 (02-07-25 @ 20:01) (13 - 18)  SpO2: 96% (02-07-25 @ 20:01) (93% - 98%)    General: NAD, resting comfortably in bed  RLE:   Dressing in place C/D/I  SCD in place bilaterally  No calf tenderness   Motor: + Hip Flexors/Quad/EHL/FHL/TA/GSC  Sensory: SILT SPN/DPN/Lanre/Saph/Tib  DP/PT 2+      A/P:  84y m s/p R JENNY POD 0  -PT/OT  -WBAT  -Pain Control  -DVT ppx: aspirin 81mg BID POD1  -Continue perioperative abx x 24 hours  -FU AM Labs  -Rest, ice, compress, and elevate the extremity as tolerated  -Incentive Spirometry  -Medical management appreciated  -Dispo per PT  -D/w Dr. Issa, will update plan as needed

## 2025-02-07 NOTE — DISCHARGE NOTE PROVIDER - NSDCMRMEDTOKEN_GEN_ALL_CORE_FT
amLODIPine 10 mg oral tablet: 1 tab(s) orally once a day (at bedtime)  amLODIPine 5 mg oral tablet: 1 tab(s) orally once a day AM dose  atorvastatin 10 mg oral tablet: 1 tab(s) orally once a day  cetirizine 5 mg oral tablet: 1 tab(s) orally once a day  montelukast 10 mg oral tablet: 1 tab(s) orally once a day (at bedtime)  pantoprazole 40 mg oral delayed release tablet: 1 tab(s) orally once a day  tamsulosin 0.4 mg oral capsule: 1 cap(s) orally 2 times a day  trospium 20 mg oral tablet: 1 tab(s) orally once a day   acetaminophen 500 mg oral tablet: 2 tab(s) orally every 8 hours  aluminum hydroxide-magnesium hydroxide 200 mg-200 mg/5 mL oral suspension: 30 milliliter(s) orally 4 times a day As needed Indigestion  amLODIPine 10 mg oral tablet: 1 tab(s) orally once a day (at bedtime)  amLODIPine 5 mg oral tablet: 1 tab(s) orally once a day AM dose  aspirin 81 mg oral delayed release tablet: 1 tab(s) orally every 12 hours  atorvastatin 10 mg oral tablet: 1 tab(s) orally once a day  cetirizine 5 mg oral tablet: 1 tab(s) orally once a day  magnesium hydroxide 8% oral suspension: 30 milliliter(s) orally once a day As needed Constipation  montelukast 10 mg oral tablet: 1 tab(s) orally once a day (at bedtime)  Multiple Vitamins oral tablet: 1 tab(s) orally once a day  ondansetron 2 mg/mL injectable solution: 4 milligram(s) injectable every 8 hours as needed for  nausea  oxyBUTYnin 5 mg oral tablet: 1 tab(s) orally 2 times a day  oxyCODONE: 2.5 milligram(s) orally every 6 hours as needed for  moderate pain  oxyCODONE 5 mg oral tablet: 1 tab(s) orally every 6 hours as needed for Severe Pain (7 - 10)  pantoprazole 40 mg oral delayed release tablet: 1 tab(s) orally once a day (before a meal)  polyethylene glycol 3350 oral powder for reconstitution: 17 gram(s) orally once a day (at bedtime)  senna leaf extract oral tablet: 2 tab(s) orally once a day (at bedtime)  tamsulosin 0.4 mg oral capsule: 1 cap(s) orally 2 times a day  traMADol 50 mg oral tablet: 0.5 tab(s) orally every 6 hours As needed Mild Pain (1 - 3)   acetaminophen 500 mg oral tablet: 2 tab(s) orally every 8 hours  aluminum hydroxide-magnesium hydroxide 200 mg-200 mg/5 mL oral suspension: 30 milliliter(s) orally 4 times a day As needed Indigestion  amLODIPine 10 mg oral tablet: 1 tab(s) orally once a day (at bedtime)  amLODIPine 5 mg oral tablet: 1 tab(s) orally once a day AM dose  aspirin 81 mg oral delayed release tablet: 1 tab(s) orally every 12 hours  atorvastatin 10 mg oral tablet: 1 tab(s) orally once a day  cetirizine 5 mg oral tablet: 1 tab(s) orally once a day  magnesium hydroxide 8% oral suspension: 30 milliliter(s) orally once a day As needed Constipation  meloxicam 15 mg oral tablet: 1 tab(s) orally once a day take for 14 days postop  montelukast 10 mg oral tablet: 1 tab(s) orally once a day (at bedtime)  Multiple Vitamins oral tablet: 1 tab(s) orally once a day  ondansetron 2 mg/mL injectable solution: 4 milligram(s) injectable every 8 hours as needed for  nausea  oxyBUTYnin 5 mg oral tablet: 1 tab(s) orally 2 times a day  oxyCODONE: 2.5 milligram(s) orally every 6 hours as needed for  moderate pain  oxyCODONE 5 mg oral tablet: 1 tab(s) orally every 6 hours as needed for Severe Pain (7 - 10)  pantoprazole 40 mg oral delayed release tablet: 1 tab(s) orally once a day (before a meal)  polyethylene glycol 3350 oral powder for reconstitution: 17 gram(s) orally once a day (at bedtime)  senna leaf extract oral tablet: 2 tab(s) orally once a day (at bedtime)  tamsulosin 0.4 mg oral capsule: 1 cap(s) orally 2 times a day  traMADol 50 mg oral tablet: 0.5 tab(s) orally every 6 hours As needed Mild Pain (1 - 3)

## 2025-02-08 LAB
ANION GAP SERPL CALC-SCNC: 5 MMOL/L — SIGNIFICANT CHANGE UP (ref 5–17)
BUN SERPL-MCNC: 14 MG/DL — SIGNIFICANT CHANGE UP (ref 7–23)
CALCIUM SERPL-MCNC: 8.7 MG/DL — SIGNIFICANT CHANGE UP (ref 8.5–10.1)
CHLORIDE SERPL-SCNC: 107 MMOL/L — SIGNIFICANT CHANGE UP (ref 96–108)
CO2 SERPL-SCNC: 23 MMOL/L — SIGNIFICANT CHANGE UP (ref 22–31)
CREAT SERPL-MCNC: 0.9 MG/DL — SIGNIFICANT CHANGE UP (ref 0.5–1.3)
EGFR: 85 ML/MIN/1.73M2 — SIGNIFICANT CHANGE UP
GLUCOSE SERPL-MCNC: 149 MG/DL — HIGH (ref 70–99)
HCT VFR BLD CALC: 33.3 % — LOW (ref 39–50)
HGB BLD-MCNC: 11.7 G/DL — LOW (ref 13–17)
MCHC RBC-ENTMCNC: 32.6 PG — SIGNIFICANT CHANGE UP (ref 27–34)
MCHC RBC-ENTMCNC: 35.1 G/DL — SIGNIFICANT CHANGE UP (ref 32–36)
MCV RBC AUTO: 92.8 FL — SIGNIFICANT CHANGE UP (ref 80–100)
NRBC # BLD: 0 /100 WBCS — SIGNIFICANT CHANGE UP (ref 0–0)
NRBC BLD-RTO: 0 /100 WBCS — SIGNIFICANT CHANGE UP (ref 0–0)
PLATELET # BLD AUTO: 153 K/UL — SIGNIFICANT CHANGE UP (ref 150–400)
POTASSIUM SERPL-MCNC: 3.9 MMOL/L — SIGNIFICANT CHANGE UP (ref 3.5–5.3)
POTASSIUM SERPL-SCNC: 3.9 MMOL/L — SIGNIFICANT CHANGE UP (ref 3.5–5.3)
RBC # BLD: 3.59 M/UL — LOW (ref 4.2–5.8)
RBC # FLD: 12.9 % — SIGNIFICANT CHANGE UP (ref 10.3–14.5)
SODIUM SERPL-SCNC: 135 MMOL/L — SIGNIFICANT CHANGE UP (ref 135–145)
WBC # BLD: 10.74 K/UL — HIGH (ref 3.8–10.5)
WBC # FLD AUTO: 10.74 K/UL — HIGH (ref 3.8–10.5)

## 2025-02-08 PROCEDURE — 99232 SBSQ HOSP IP/OBS MODERATE 35: CPT

## 2025-02-08 RX ORDER — OXYCODONE HYDROCHLORIDE 30 MG/1
1 TABLET ORAL
Qty: 0 | Refills: 0 | DISCHARGE
Start: 2025-02-08

## 2025-02-08 RX ORDER — TROSPIUM CHLORIDE 20 MG/1
1 TABLET, FILM COATED ORAL
Refills: 0 | DISCHARGE

## 2025-02-08 RX ORDER — SENNOSIDES 8.6 MG
2 TABLET ORAL
Qty: 0 | Refills: 0 | DISCHARGE
Start: 2025-02-08

## 2025-02-08 RX ORDER — PANTOPRAZOLE 20 MG/1
1 TABLET, DELAYED RELEASE ORAL
Refills: 0 | DISCHARGE

## 2025-02-08 RX ORDER — ASPIRIN 81 MG/1
1 TABLET, COATED ORAL
Qty: 0 | Refills: 0 | DISCHARGE
Start: 2025-02-08

## 2025-02-08 RX ORDER — PANTOPRAZOLE 20 MG/1
1 TABLET, DELAYED RELEASE ORAL
Qty: 0 | Refills: 0 | DISCHARGE
Start: 2025-02-08

## 2025-02-08 RX ORDER — OXYCODONE HYDROCHLORIDE 30 MG/1
2.5 TABLET ORAL
Qty: 0 | Refills: 0 | DISCHARGE
Start: 2025-02-08

## 2025-02-08 RX ORDER — TRAMADOL HYDROCHLORIDE 100 MG/1
0.5 TABLET, EXTENDED RELEASE ORAL
Qty: 0 | Refills: 0 | DISCHARGE
Start: 2025-02-08

## 2025-02-08 RX ORDER — TAMSULOSIN HYDROCHLORIDE 0.4 MG/1
1 CAPSULE ORAL
Refills: 0 | DISCHARGE

## 2025-02-08 RX ORDER — POLYETHYLENE GLYCOL 3350 17 G/17G
17 POWDER, FOR SOLUTION ORAL
Qty: 0 | Refills: 0 | DISCHARGE
Start: 2025-02-08

## 2025-02-08 RX ORDER — ACETAMINOPHEN 160 MG/5ML
2 SUSPENSION ORAL
Qty: 0 | Refills: 0 | DISCHARGE
Start: 2025-02-08

## 2025-02-08 RX ORDER — MONTELUKAST SODIUM 5 MG/1
1 TABLET, CHEWABLE ORAL
Refills: 0 | DISCHARGE

## 2025-02-08 RX ORDER — OXYBUTYNIN CHLORIDE 5 MG/1
1 TABLET, EXTENDED RELEASE ORAL
Qty: 0 | Refills: 0 | DISCHARGE
Start: 2025-02-08

## 2025-02-08 RX ORDER — ONDANSETRON 4 MG/1
4 TABLET, ORALLY DISINTEGRATING ORAL
Qty: 0 | Refills: 0 | DISCHARGE
Start: 2025-02-08

## 2025-02-08 RX ORDER — MAGNESIUM HYDROXIDE 400 MG/5ML
30 SUSPENSION, ORAL (FINAL DOSE FORM) ORAL
Qty: 0 | Refills: 0 | DISCHARGE
Start: 2025-02-08

## 2025-02-08 RX ORDER — MAGNESIUM, ALUMINUM HYDROXIDE 200-225/5
30 SUSPENSION, ORAL (FINAL DOSE FORM) ORAL
Qty: 0 | Refills: 0 | DISCHARGE
Start: 2025-02-08

## 2025-02-08 RX ORDER — MONTELUKAST SODIUM 5 MG/1
1 TABLET, CHEWABLE ORAL
Qty: 0 | Refills: 0 | DISCHARGE
Start: 2025-02-08

## 2025-02-08 RX ORDER — TAMSULOSIN HYDROCHLORIDE 0.4 MG/1
1 CAPSULE ORAL
Qty: 0 | Refills: 0 | DISCHARGE
Start: 2025-02-08

## 2025-02-08 RX ORDER — MECOBAL/LEVOMEFOLAT CA/B6 PHOS 2-3-35 MG
1 TABLET ORAL
Qty: 0 | Refills: 0 | DISCHARGE
Start: 2025-02-08

## 2025-02-08 RX ADMIN — Medication 5 MILLIGRAM(S): at 12:14

## 2025-02-08 RX ADMIN — ACETAMINOPHEN 1000 MILLIGRAM(S): 160 SUSPENSION ORAL at 14:10

## 2025-02-08 RX ADMIN — PANTOPRAZOLE 40 MILLIGRAM(S): 20 TABLET, DELAYED RELEASE ORAL at 08:36

## 2025-02-08 RX ADMIN — Medication 2 TABLET(S): at 21:43

## 2025-02-08 RX ADMIN — ACETAMINOPHEN 1000 MILLIGRAM(S): 160 SUSPENSION ORAL at 15:00

## 2025-02-08 RX ADMIN — ACETAMINOPHEN 1000 MILLIGRAM(S): 160 SUSPENSION ORAL at 21:42

## 2025-02-08 RX ADMIN — TAMSULOSIN HYDROCHLORIDE 0.4 MILLIGRAM(S): 0.4 CAPSULE ORAL at 05:13

## 2025-02-08 RX ADMIN — ACETAMINOPHEN 1000 MILLIGRAM(S): 160 SUSPENSION ORAL at 05:12

## 2025-02-08 RX ADMIN — ASPIRIN 81 MILLIGRAM(S): 81 TABLET, COATED ORAL at 17:43

## 2025-02-08 RX ADMIN — MONTELUKAST SODIUM 10 MILLIGRAM(S): 5 TABLET, CHEWABLE ORAL at 21:44

## 2025-02-08 RX ADMIN — Medication 10 MILLIGRAM(S): at 21:43

## 2025-02-08 RX ADMIN — ACETAMINOPHEN 1000 MILLIGRAM(S): 160 SUSPENSION ORAL at 06:12

## 2025-02-08 RX ADMIN — TAMSULOSIN HYDROCHLORIDE 0.4 MILLIGRAM(S): 0.4 CAPSULE ORAL at 17:43

## 2025-02-08 RX ADMIN — OXYBUTYNIN CHLORIDE 5 MILLIGRAM(S): 5 TABLET, EXTENDED RELEASE ORAL at 17:43

## 2025-02-08 RX ADMIN — ASPIRIN 81 MILLIGRAM(S): 81 TABLET, COATED ORAL at 05:14

## 2025-02-08 RX ADMIN — CLINDAMYCIN HYDROCHLORIDE 100 MILLIGRAM(S): 150 CAPSULE ORAL at 05:13

## 2025-02-08 RX ADMIN — ATORVASTATIN CALCIUM 10 MILLIGRAM(S): 80 TABLET, FILM COATED ORAL at 21:42

## 2025-02-08 RX ADMIN — OXYBUTYNIN CHLORIDE 5 MILLIGRAM(S): 5 TABLET, EXTENDED RELEASE ORAL at 05:12

## 2025-02-08 RX ADMIN — POLYETHYLENE GLYCOL 3350 17 GRAM(S): 17 POWDER, FOR SOLUTION ORAL at 21:43

## 2025-02-08 RX ADMIN — Medication 1 TABLET(S): at 12:13

## 2025-02-08 RX ADMIN — Medication 5 MILLIGRAM(S): at 05:12

## 2025-02-08 RX ADMIN — ACETAMINOPHEN 1000 MILLIGRAM(S): 160 SUSPENSION ORAL at 22:12

## 2025-02-08 NOTE — PROGRESS NOTE ADULT - ATTENDING COMMENTS
s/p Right JENNY for femoral neck fracture  vitals/labs reviewed    RLE:  dressing c/d/i  intact hip flexion, quad, fhl, ehl, gs, ta  SILT throughout distally  palp DP    WBAT RLE  PT/OT  Aspirin 81 BID  Postop ancef

## 2025-02-08 NOTE — PROGRESS NOTE ADULT - SUBJECTIVE AND OBJECTIVE BOX
Pt seen and examined at bedside. No acute events overnight. Pain controlled, denies any numbness or tingling. Denies nausea, vomiting, chest pain, or shortness of breath.         LABS:                        11.7   10.74 )-----------( 153      ( 08 Feb 2025 08:15 )             33.3     02-08    135  |  107  |  14  ----------------------------<  149[H]  3.9   |  23  |  0.90    Ca    8.7      08 Feb 2025 08:15      PT/INR - ( 07 Feb 2025 04:10 )   PT: 12.2 sec;   INR: 1.08 ratio         PTT - ( 07 Feb 2025 04:10 )  PTT:35.9 sec  Urinalysis Basic - ( 08 Feb 2025 08:15 )    Color: x / Appearance: x / SG: x / pH: x  Gluc: 149 mg/dL / Ketone: x  / Bili: x / Urobili: x   Blood: x / Protein: x / Nitrite: x   Leuk Esterase: x / RBC: x / WBC x   Sq Epi: x / Non Sq Epi: x / Bacteria: x        VITAL SIGNS:  T(C): 36.3 (02-08-25 @ 11:35), Max: 36.6 (02-08-25 @ 05:01)  HR: 87 (02-08-25 @ 11:35) (66 - 88)  BP: 103/67 (02-08-25 @ 11:35) (95/64 - 145/72)  RR: 18 (02-08-25 @ 11:35) (13 - 19)  SpO2: 95% (02-08-25 @ 11:35) (93% - 98%)        EXAM:  Gen: NAD    Right Lower Extremity:  Dressing clean/dry/intact  Soft compartments  Negative calf ttp  +EHL/FHL/TA/GSc  SILT SPN, DPN, Tib, Saph, Lanre  DP+           A/P:  A/P:  84y m s/p R JENNY POD 1  -PT/OT  -WBAT, Anterior hip precautions  -Pain Control  -DVT ppx: aspirin 81mg BID   -FU AM Labs  -Rest, ice, compress, and elevate the extremity as tolerated  -Incentive Spirometry  -Medical management appreciated  -Dispo per PT  -D/w Dr. Issa, will update plan as needed Pt seen and examined at bedside. No acute events overnight. Pain controlled, denies any numbness or tingling. Denies nausea, vomiting, chest pain, or shortness of breath.         LABS:                        11.7   10.74 )-----------( 153      ( 08 Feb 2025 08:15 )             33.3     02-08    135  |  107  |  14  ----------------------------<  149[H]  3.9   |  23  |  0.90    Ca    8.7      08 Feb 2025 08:15      PT/INR - ( 07 Feb 2025 04:10 )   PT: 12.2 sec;   INR: 1.08 ratio         PTT - ( 07 Feb 2025 04:10 )  PTT:35.9 sec  Urinalysis Basic - ( 08 Feb 2025 08:15 )    Color: x / Appearance: x / SG: x / pH: x  Gluc: 149 mg/dL / Ketone: x  / Bili: x / Urobili: x   Blood: x / Protein: x / Nitrite: x   Leuk Esterase: x / RBC: x / WBC x   Sq Epi: x / Non Sq Epi: x / Bacteria: x        VITAL SIGNS:  T(C): 36.3 (02-08-25 @ 11:35), Max: 36.6 (02-08-25 @ 05:01)  HR: 87 (02-08-25 @ 11:35) (66 - 88)  BP: 103/67 (02-08-25 @ 11:35) (95/64 - 145/72)  RR: 18 (02-08-25 @ 11:35) (13 - 19)  SpO2: 95% (02-08-25 @ 11:35) (93% - 98%)        EXAM:  Gen: NAD    Right Lower Extremity:  Dressing clean/dry/intact  Soft compartments  Negative calf ttp  +EHL/FHL/TA/GSc  SILT SPN, DPN, Tib, Saph, Lanre  DP+           A/P:  84y m s/p R JENNY POD 1  -PT/OT  -WBAT, Anterior hip precautions  -Pain Control  -DVT ppx: aspirin 81mg BID   -FU AM Labs  -Rest, ice, compress, and elevate the extremity as tolerated  -Incentive Spirometry  -Medical management appreciated  -Dispo per PT  -D/w Dr. Issa, will update plan as needed

## 2025-02-08 NOTE — PROGRESS NOTE ADULT - SUBJECTIVE AND OBJECTIVE BOX
Patient is a 84y old  Male who presents with a chief complaint of R Femoral Neck Fracture (08 Feb 2025 07:11)    INTERVAL HPI/OVERNIGHT EVENTS: no events     MEDICATIONS  (STANDING):  acetaminophen     Tablet .. 1000 milliGRAM(s) Oral every 8 hours  amLODIPine   Tablet 10 milliGRAM(s) Oral at bedtime  amLODIPine   Tablet 5 milliGRAM(s) Oral daily  aspirin enteric coated 81 milliGRAM(s) Oral every 12 hours  atorvastatin 10 milliGRAM(s) Oral at bedtime  cetirizine 5 milliGRAM(s) Oral daily  chlorhexidine 2% Cloths 1 Application(s) Topical once  montelukast 10 milliGRAM(s) Oral at bedtime  multivitamin 1 Tablet(s) Oral daily  oxybutynin 5 milliGRAM(s) Oral two times a day  pantoprazole    Tablet 40 milliGRAM(s) Oral before breakfast  polyethylene glycol 3350 17 Gram(s) Oral at bedtime  senna 2 Tablet(s) Oral at bedtime  sodium chloride 0.9%. 1000 milliLiter(s) (100 mL/Hr) IV Continuous <Continuous>  sodium chloride 0.9%. 1000 milliLiter(s) (100 mL/Hr) IV Continuous <Continuous>  tamsulosin 0.4 milliGRAM(s) Oral two times a day    MEDICATIONS  (PRN):  aluminum hydroxide/magnesium hydroxide/simethicone Suspension 30 milliLiter(s) Oral four times a day PRN Indigestion  magnesium hydroxide Suspension 30 milliLiter(s) Oral daily PRN Constipation  ondansetron Injectable 4 milliGRAM(s) IV Push every 6 hours PRN Nausea and/or Vomiting  oxyCODONE    IR 2.5 milliGRAM(s) Oral every 3 hours PRN Moderate Pain (4 - 6)  oxyCODONE    IR 5 milliGRAM(s) Oral every 3 hours PRN Severe Pain (7 - 10)  traMADol 25 milliGRAM(s) Oral every 6 hours PRN Mild Pain (1 - 3)    Allergies    Allergy Status Unknown    Intolerances      REVIEW OF SYSTEMS:  All other systems reviewed and are negative    Vital Signs Last 24 Hrs  T(C): 36.3 (08 Feb 2025 11:35), Max: 36.6 (08 Feb 2025 05:01)  T(F): 97.3 (08 Feb 2025 11:35), Max: 97.8 (08 Feb 2025 05:01)  HR: 87 (08 Feb 2025 11:35) (66 - 88)  BP: 103/67 (08 Feb 2025 11:35) (95/64 - 145/72)  BP(mean): 94 (07 Feb 2025 20:01) (72 - 99)  RR: 18 (08 Feb 2025 11:35) (13 - 19)  SpO2: 95% (08 Feb 2025 11:35) (93% - 98%)    Parameters below as of 08 Feb 2025 11:35  Patient On (Oxygen Delivery Method): room air      Daily     Daily   I&O's Summary    07 Feb 2025 07:01  -  08 Feb 2025 07:00  --------------------------------------------------------  IN: 150 mL / OUT: 0 mL / NET: 150 mL      CAPILLARY BLOOD GLUCOSE        PHYSICAL EXAM:  GENERAL: NAD,    HEAD:  Atraumatic, Normocephalic  EYES: EOMI, PERRLA, conjunctiva and sclera clear  ENMT: No tonsillar erythema, exudates, or enlargement; Moist mucous membranes, Good dentition, No lesions  NECK: Supple, No JVD, Normal thyroid  NERVOUS SYSTEM:  Alert & Oriented X3, Good concentration; Motor Strength 5/5 B/L upper and lower extremities; DTRs 2+ intact and symmetric  CHEST/LUNG: Clear to percussion bilaterally; No rales, rhonchi, wheezing, or rubs  HEART: Regular rate and rhythm; No murmurs, rubs, or gallops  ABDOMEN: Soft, Nontender, Nondistended; Bowel sounds present  EXTREMITIES:  2+ Peripheral Pulses, No clubbing, cyanosis, or edema  LYMPH: No lymphadenopathy noted  SKIN: No rashes or lesions    Labs                          11.7   10.74 )-----------( 153      ( 08 Feb 2025 08:15 )             33.3     02-08    135  |  107  |  14  ----------------------------<  149[H]  3.9   |  23  |  0.90    Ca    8.7      08 Feb 2025 08:15      PT/INR - ( 07 Feb 2025 04:10 )   PT: 12.2 sec;   INR: 1.08 ratio         PTT - ( 07 Feb 2025 04:10 )  PTT:35.9 sec      Urinalysis Basic - ( 08 Feb 2025 08:15 )    Color: x / Appearance: x / SG: x / pH: x  Gluc: 149 mg/dL / Ketone: x  / Bili: x / Urobili: x   Blood: x / Protein: x / Nitrite: x   Leuk Esterase: x / RBC: x / WBC x   Sq Epi: x / Non Sq Epi: x / Bacteria: x                  DVT prophylaxis: > Lovenox 40mg SQ daily  > Heparin   > SCD's

## 2025-02-08 NOTE — PHYSICAL THERAPY INITIAL EVALUATION ADULT - GENERAL OBSERVATIONS, REHAB EVAL
Chart reviewed, Pt found in semireclining in bed, pt vitals stable, + heplock. OTR Rell Present with PT.

## 2025-02-08 NOTE — PHYSICAL THERAPY INITIAL EVALUATION ADULT - PERTINENT HX OF CURRENT PROBLEM, REHAB EVAL
A 84y Male status post right total hip arthroplasty. Patient initially presented to [facility] with concern of right femoral neck fracture and was medically optimized for surgery. The patient was taken to the operating room on 2/7/25

## 2025-02-08 NOTE — PHYSICAL THERAPY INITIAL EVALUATION ADULT - ADDITIONAL COMMENTS
Pt reports he lives alone in a private house with 3 steps to enter with a right rail. Pt was independent with ADLs and mobility prior to admission & pt's daughter provided support as needed.

## 2025-02-09 LAB
ANION GAP SERPL CALC-SCNC: 5 MMOL/L — SIGNIFICANT CHANGE UP (ref 5–17)
BUN SERPL-MCNC: 23 MG/DL — SIGNIFICANT CHANGE UP (ref 7–23)
CALCIUM SERPL-MCNC: 8.8 MG/DL — SIGNIFICANT CHANGE UP (ref 8.5–10.1)
CHLORIDE SERPL-SCNC: 109 MMOL/L — HIGH (ref 96–108)
CO2 SERPL-SCNC: 25 MMOL/L — SIGNIFICANT CHANGE UP (ref 22–31)
CREAT SERPL-MCNC: 0.96 MG/DL — SIGNIFICANT CHANGE UP (ref 0.5–1.3)
EGFR: 78 ML/MIN/1.73M2 — SIGNIFICANT CHANGE UP
GLUCOSE SERPL-MCNC: 103 MG/DL — HIGH (ref 70–99)
HCT VFR BLD CALC: 31.7 % — LOW (ref 39–50)
HGB BLD-MCNC: 11 G/DL — LOW (ref 13–17)
MCHC RBC-ENTMCNC: 32.8 PG — SIGNIFICANT CHANGE UP (ref 27–34)
MCHC RBC-ENTMCNC: 34.7 G/DL — SIGNIFICANT CHANGE UP (ref 32–36)
MCV RBC AUTO: 94.6 FL — SIGNIFICANT CHANGE UP (ref 80–100)
NRBC # BLD: 0 /100 WBCS — SIGNIFICANT CHANGE UP (ref 0–0)
NRBC BLD-RTO: 0 /100 WBCS — SIGNIFICANT CHANGE UP (ref 0–0)
PLATELET # BLD AUTO: 178 K/UL — SIGNIFICANT CHANGE UP (ref 150–400)
POTASSIUM SERPL-MCNC: 4.7 MMOL/L — SIGNIFICANT CHANGE UP (ref 3.5–5.3)
POTASSIUM SERPL-SCNC: 4.7 MMOL/L — SIGNIFICANT CHANGE UP (ref 3.5–5.3)
RBC # BLD: 3.35 M/UL — LOW (ref 4.2–5.8)
RBC # FLD: 13.3 % — SIGNIFICANT CHANGE UP (ref 10.3–14.5)
SODIUM SERPL-SCNC: 139 MMOL/L — SIGNIFICANT CHANGE UP (ref 135–145)
WBC # BLD: 8.43 K/UL — SIGNIFICANT CHANGE UP (ref 3.8–10.5)
WBC # FLD AUTO: 8.43 K/UL — SIGNIFICANT CHANGE UP (ref 3.8–10.5)

## 2025-02-09 PROCEDURE — 99232 SBSQ HOSP IP/OBS MODERATE 35: CPT

## 2025-02-09 RX ADMIN — Medication 2 TABLET(S): at 21:57

## 2025-02-09 RX ADMIN — Medication 1 TABLET(S): at 11:02

## 2025-02-09 RX ADMIN — MONTELUKAST SODIUM 10 MILLIGRAM(S): 5 TABLET, CHEWABLE ORAL at 21:57

## 2025-02-09 RX ADMIN — PANTOPRAZOLE 40 MILLIGRAM(S): 20 TABLET, DELAYED RELEASE ORAL at 05:22

## 2025-02-09 RX ADMIN — TAMSULOSIN HYDROCHLORIDE 0.4 MILLIGRAM(S): 0.4 CAPSULE ORAL at 05:18

## 2025-02-09 RX ADMIN — POLYETHYLENE GLYCOL 3350 17 GRAM(S): 17 POWDER, FOR SOLUTION ORAL at 21:57

## 2025-02-09 RX ADMIN — OXYCODONE HYDROCHLORIDE 5 MILLIGRAM(S): 30 TABLET ORAL at 11:02

## 2025-02-09 RX ADMIN — ASPIRIN 81 MILLIGRAM(S): 81 TABLET, COATED ORAL at 17:04

## 2025-02-09 RX ADMIN — OXYBUTYNIN CHLORIDE 5 MILLIGRAM(S): 5 TABLET, EXTENDED RELEASE ORAL at 17:04

## 2025-02-09 RX ADMIN — Medication 5 MILLIGRAM(S): at 11:02

## 2025-02-09 RX ADMIN — OXYCODONE HYDROCHLORIDE 5 MILLIGRAM(S): 30 TABLET ORAL at 12:00

## 2025-02-09 RX ADMIN — ACETAMINOPHEN 1000 MILLIGRAM(S): 160 SUSPENSION ORAL at 14:26

## 2025-02-09 RX ADMIN — ATORVASTATIN CALCIUM 10 MILLIGRAM(S): 80 TABLET, FILM COATED ORAL at 21:57

## 2025-02-09 RX ADMIN — TAMSULOSIN HYDROCHLORIDE 0.4 MILLIGRAM(S): 0.4 CAPSULE ORAL at 17:04

## 2025-02-09 RX ADMIN — ACETAMINOPHEN 1000 MILLIGRAM(S): 160 SUSPENSION ORAL at 21:57

## 2025-02-09 RX ADMIN — OXYBUTYNIN CHLORIDE 5 MILLIGRAM(S): 5 TABLET, EXTENDED RELEASE ORAL at 05:09

## 2025-02-09 RX ADMIN — Medication 5 MILLIGRAM(S): at 05:18

## 2025-02-09 RX ADMIN — ACETAMINOPHEN 1000 MILLIGRAM(S): 160 SUSPENSION ORAL at 22:57

## 2025-02-09 RX ADMIN — ASPIRIN 81 MILLIGRAM(S): 81 TABLET, COATED ORAL at 05:20

## 2025-02-09 RX ADMIN — Medication 10 MILLIGRAM(S): at 21:57

## 2025-02-09 RX ADMIN — ACETAMINOPHEN 1000 MILLIGRAM(S): 160 SUSPENSION ORAL at 13:28

## 2025-02-09 NOTE — PROGRESS NOTE ADULT - SUBJECTIVE AND OBJECTIVE BOX
Patient is a 84y old  Male who presents with a chief complaint of R Femoral Neck Fracture (09 Feb 2025 07:25)    INTERVAL HPI/OVERNIGHT EVENTS:    MEDICATIONS  (STANDING):  acetaminophen     Tablet .. 1000 milliGRAM(s) Oral every 8 hours  amLODIPine   Tablet 10 milliGRAM(s) Oral at bedtime  amLODIPine   Tablet 5 milliGRAM(s) Oral daily  aspirin enteric coated 81 milliGRAM(s) Oral every 12 hours  atorvastatin 10 milliGRAM(s) Oral at bedtime  cetirizine 5 milliGRAM(s) Oral daily  chlorhexidine 2% Cloths 1 Application(s) Topical once  montelukast 10 milliGRAM(s) Oral at bedtime  multivitamin 1 Tablet(s) Oral daily  oxybutynin 5 milliGRAM(s) Oral two times a day  pantoprazole    Tablet 40 milliGRAM(s) Oral before breakfast  polyethylene glycol 3350 17 Gram(s) Oral at bedtime  senna 2 Tablet(s) Oral at bedtime  sodium chloride 0.9%. 1000 milliLiter(s) (100 mL/Hr) IV Continuous <Continuous>  sodium chloride 0.9%. 1000 milliLiter(s) (100 mL/Hr) IV Continuous <Continuous>  tamsulosin 0.4 milliGRAM(s) Oral two times a day    MEDICATIONS  (PRN):  aluminum hydroxide/magnesium hydroxide/simethicone Suspension 30 milliLiter(s) Oral four times a day PRN Indigestion  magnesium hydroxide Suspension 30 milliLiter(s) Oral daily PRN Constipation  ondansetron Injectable 4 milliGRAM(s) IV Push every 6 hours PRN Nausea and/or Vomiting  oxyCODONE    IR 2.5 milliGRAM(s) Oral every 3 hours PRN Moderate Pain (4 - 6)  oxyCODONE    IR 5 milliGRAM(s) Oral every 3 hours PRN Severe Pain (7 - 10)  traMADol 25 milliGRAM(s) Oral every 6 hours PRN Mild Pain (1 - 3)    Allergies    Allergy Status Unknown    Intolerances      REVIEW OF SYSTEMS:  All other systems reviewed and are negative    Vital Signs Last 24 Hrs  T(C): 36.4 (09 Feb 2025 04:51), Max: 36.7 (08 Feb 2025 23:37)  T(F): 97.5 (09 Feb 2025 04:51), Max: 98.1 (08 Feb 2025 23:37)  HR: 90 (09 Feb 2025 04:51) (85 - 90)  BP: 105/63 (09 Feb 2025 04:51) (103/67 - 121/75)  BP(mean): --  RR: 18 (09 Feb 2025 04:51) (17 - 18)  SpO2: 94% (09 Feb 2025 04:51) (94% - 95%)    Parameters below as of 09 Feb 2025 04:51  Patient On (Oxygen Delivery Method): room air      Daily     Daily   I&O's Summary    08 Feb 2025 07:01  -  09 Feb 2025 07:00  --------------------------------------------------------  IN: 0 mL / OUT: 575 mL / NET: -575 mL      CAPILLARY BLOOD GLUCOSE        PHYSICAL EXAM:  GENERAL: NAD,    HEAD:  Atraumatic, Normocephalic  EYES: EOMI, PERRLA, conjunctiva and sclera clear  ENMT: No tonsillar erythema, exudates, or enlargement; Moist mucous membranes, Good dentition, No lesions  NECK: Supple, No JVD, Normal thyroid  NERVOUS SYSTEM:  Alert & Oriented X3, Good concentration; Motor Strength 5/5 B/L upper and lower extremities; DTRs 2+ intact and symmetric  CHEST/LUNG: Clear to percussion bilaterally; No rales, rhonchi, wheezing, or rubs  HEART: Regular rate and rhythm; No murmurs, rubs, or gallops  ABDOMEN: Soft, Nontender, Nondistended; Bowel sounds present  EXTREMITIES:  2+ Peripheral Pulses, No clubbing, cyanosis, or edema  LYMPH: No lymphadenopathy noted  SKIN: No rashes or lesions    Labs                          11.0   8.43  )-----------( 178      ( 09 Feb 2025 08:10 )             31.7     02-09    139  |  109[H]  |  23  ----------------------------<  103[H]  4.7   |  25  |  0.96    Ca    8.8      09 Feb 2025 08:10            Urinalysis Basic - ( 09 Feb 2025 08:10 )    Color: x / Appearance: x / SG: x / pH: x  Gluc: 103 mg/dL / Ketone: x  / Bili: x / Urobili: x   Blood: x / Protein: x / Nitrite: x   Leuk Esterase: x / RBC: x / WBC x   Sq Epi: x / Non Sq Epi: x / Bacteria: x                  DVT prophylaxis: > Lovenox 40mg SQ daily  > Heparin   > SCD's

## 2025-02-09 NOTE — PROGRESS NOTE ADULT - SUBJECTIVE AND OBJECTIVE BOX
Pt seen and examined at bedside. No acute events overnight. Pain controlled, denies any numbness or tingling. Denies nausea, vomiting, chest pain, or shortness of breath.         LABS:                        11.7   10.74 )-----------( 153      ( 08 Feb 2025 08:15 )             33.3     02-08    135  |  107  |  14  ----------------------------<  149[H]  3.9   |  23  |  0.90    Ca    8.7      08 Feb 2025 08:15        Urinalysis Basic - ( 08 Feb 2025 08:15 )    Color: x / Appearance: x / SG: x / pH: x  Gluc: 149 mg/dL / Ketone: x  / Bili: x / Urobili: x   Blood: x / Protein: x / Nitrite: x   Leuk Esterase: x / RBC: x / WBC x   Sq Epi: x / Non Sq Epi: x / Bacteria: x        VITAL SIGNS:  T(C): 36.4 (02-09-25 @ 04:51), Max: 36.7 (02-08-25 @ 23:37)  HR: 90 (02-09-25 @ 04:51) (79 - 90)  BP: 105/63 (02-09-25 @ 04:51) (103/67 - 121/75)  RR: 18 (02-09-25 @ 04:51) (17 - 19)  SpO2: 94% (02-09-25 @ 04:51) (94% - 95%)        EXAM:  Gen: NAD    Right Lower Extremity:  Dressing clean/dry/intact  Soft compartments  Negative calf ttp  +EHL/FHL/TA/GSc  SILT SPN, DPN, Tib, Saph, Lanre  DP+           A/P:  84y m s/p R JENNY POD 2  -PT/OT  -WBAT, Anterior hip precautions  -Pain Control  -DVT ppx: aspirin 81mg BID   -FU AM Labs  -Rest, ice, compress, and elevate the extremity as tolerated  -Incentive Spirometry  -Medical management appreciated  -Dispo per PT - MERRITT  -D/w Dr. Issa, will update plan as needed

## 2025-02-10 LAB
ANION GAP SERPL CALC-SCNC: 3 MMOL/L — LOW (ref 5–17)
BUN SERPL-MCNC: 19 MG/DL — SIGNIFICANT CHANGE UP (ref 7–23)
CALCIUM SERPL-MCNC: 8.7 MG/DL — SIGNIFICANT CHANGE UP (ref 8.5–10.1)
CHLORIDE SERPL-SCNC: 110 MMOL/L — HIGH (ref 96–108)
CO2 SERPL-SCNC: 27 MMOL/L — SIGNIFICANT CHANGE UP (ref 22–31)
CREAT SERPL-MCNC: 0.88 MG/DL — SIGNIFICANT CHANGE UP (ref 0.5–1.3)
EGFR: 85 ML/MIN/1.73M2 — SIGNIFICANT CHANGE UP
GLUCOSE SERPL-MCNC: 96 MG/DL — SIGNIFICANT CHANGE UP (ref 70–99)
HCT VFR BLD CALC: 32.8 % — LOW (ref 39–50)
HGB BLD-MCNC: 11.2 G/DL — LOW (ref 13–17)
MCHC RBC-ENTMCNC: 32.6 PG — SIGNIFICANT CHANGE UP (ref 27–34)
MCHC RBC-ENTMCNC: 34.1 G/DL — SIGNIFICANT CHANGE UP (ref 32–36)
MCV RBC AUTO: 95.3 FL — SIGNIFICANT CHANGE UP (ref 80–100)
NRBC # BLD: 0 /100 WBCS — SIGNIFICANT CHANGE UP (ref 0–0)
NRBC BLD-RTO: 0 /100 WBCS — SIGNIFICANT CHANGE UP (ref 0–0)
PLATELET # BLD AUTO: 193 K/UL — SIGNIFICANT CHANGE UP (ref 150–400)
POTASSIUM SERPL-MCNC: 4.6 MMOL/L — SIGNIFICANT CHANGE UP (ref 3.5–5.3)
POTASSIUM SERPL-SCNC: 4.6 MMOL/L — SIGNIFICANT CHANGE UP (ref 3.5–5.3)
RBC # BLD: 3.44 M/UL — LOW (ref 4.2–5.8)
RBC # FLD: 13.3 % — SIGNIFICANT CHANGE UP (ref 10.3–14.5)
SODIUM SERPL-SCNC: 140 MMOL/L — SIGNIFICANT CHANGE UP (ref 135–145)
WBC # BLD: 7.31 K/UL — SIGNIFICANT CHANGE UP (ref 3.8–10.5)
WBC # FLD AUTO: 7.31 K/UL — SIGNIFICANT CHANGE UP (ref 3.8–10.5)

## 2025-02-10 PROCEDURE — 99232 SBSQ HOSP IP/OBS MODERATE 35: CPT

## 2025-02-10 RX ADMIN — Medication 10 MILLIGRAM(S): at 22:03

## 2025-02-10 RX ADMIN — TAMSULOSIN HYDROCHLORIDE 0.4 MILLIGRAM(S): 0.4 CAPSULE ORAL at 06:42

## 2025-02-10 RX ADMIN — ACETAMINOPHEN 1000 MILLIGRAM(S): 160 SUSPENSION ORAL at 08:57

## 2025-02-10 RX ADMIN — PANTOPRAZOLE 40 MILLIGRAM(S): 20 TABLET, DELAYED RELEASE ORAL at 06:42

## 2025-02-10 RX ADMIN — OXYCODONE HYDROCHLORIDE 5 MILLIGRAM(S): 30 TABLET ORAL at 18:51

## 2025-02-10 RX ADMIN — ACETAMINOPHEN 1000 MILLIGRAM(S): 160 SUSPENSION ORAL at 22:03

## 2025-02-10 RX ADMIN — ATORVASTATIN CALCIUM 10 MILLIGRAM(S): 80 TABLET, FILM COATED ORAL at 22:03

## 2025-02-10 RX ADMIN — ACETAMINOPHEN 1000 MILLIGRAM(S): 160 SUSPENSION ORAL at 13:22

## 2025-02-10 RX ADMIN — Medication 1 TABLET(S): at 11:36

## 2025-02-10 RX ADMIN — ACETAMINOPHEN 1000 MILLIGRAM(S): 160 SUSPENSION ORAL at 23:03

## 2025-02-10 RX ADMIN — MONTELUKAST SODIUM 10 MILLIGRAM(S): 5 TABLET, CHEWABLE ORAL at 22:03

## 2025-02-10 RX ADMIN — OXYCODONE HYDROCHLORIDE 5 MILLIGRAM(S): 30 TABLET ORAL at 10:15

## 2025-02-10 RX ADMIN — OXYCODONE HYDROCHLORIDE 5 MILLIGRAM(S): 30 TABLET ORAL at 17:54

## 2025-02-10 RX ADMIN — OXYCODONE HYDROCHLORIDE 5 MILLIGRAM(S): 30 TABLET ORAL at 11:15

## 2025-02-10 RX ADMIN — OXYBUTYNIN CHLORIDE 5 MILLIGRAM(S): 5 TABLET, EXTENDED RELEASE ORAL at 17:54

## 2025-02-10 RX ADMIN — ASPIRIN 81 MILLIGRAM(S): 81 TABLET, COATED ORAL at 06:42

## 2025-02-10 RX ADMIN — TAMSULOSIN HYDROCHLORIDE 0.4 MILLIGRAM(S): 0.4 CAPSULE ORAL at 17:54

## 2025-02-10 RX ADMIN — ACETAMINOPHEN 1000 MILLIGRAM(S): 160 SUSPENSION ORAL at 06:41

## 2025-02-10 RX ADMIN — OXYBUTYNIN CHLORIDE 5 MILLIGRAM(S): 5 TABLET, EXTENDED RELEASE ORAL at 07:09

## 2025-02-10 RX ADMIN — Medication 5 MILLIGRAM(S): at 11:36

## 2025-02-10 RX ADMIN — Medication 2 TABLET(S): at 22:08

## 2025-02-10 RX ADMIN — ACETAMINOPHEN 1000 MILLIGRAM(S): 160 SUSPENSION ORAL at 14:20

## 2025-02-10 RX ADMIN — ASPIRIN 81 MILLIGRAM(S): 81 TABLET, COATED ORAL at 17:54

## 2025-02-10 RX ADMIN — Medication 5 MILLIGRAM(S): at 06:42

## 2025-02-10 NOTE — PROGRESS NOTE ADULT - SUBJECTIVE AND OBJECTIVE BOX
Patient is a 84y old  Male who presents with a chief complaint of R Femoral Neck Fracture (10 Feb 2025 06:11)    INTERVAL HPI/OVERNIGHT EVENTS:    MEDICATIONS  (STANDING):  acetaminophen     Tablet .. 1000 milliGRAM(s) Oral every 8 hours  amLODIPine   Tablet 10 milliGRAM(s) Oral at bedtime  amLODIPine   Tablet 5 milliGRAM(s) Oral daily  aspirin enteric coated 81 milliGRAM(s) Oral every 12 hours  atorvastatin 10 milliGRAM(s) Oral at bedtime  cetirizine 5 milliGRAM(s) Oral daily  chlorhexidine 2% Cloths 1 Application(s) Topical once  montelukast 10 milliGRAM(s) Oral at bedtime  multivitamin 1 Tablet(s) Oral daily  oxybutynin 5 milliGRAM(s) Oral two times a day  pantoprazole    Tablet 40 milliGRAM(s) Oral before breakfast  polyethylene glycol 3350 17 Gram(s) Oral at bedtime  senna 2 Tablet(s) Oral at bedtime  sodium chloride 0.9%. 1000 milliLiter(s) (100 mL/Hr) IV Continuous <Continuous>  tamsulosin 0.4 milliGRAM(s) Oral two times a day    MEDICATIONS  (PRN):  aluminum hydroxide/magnesium hydroxide/simethicone Suspension 30 milliLiter(s) Oral four times a day PRN Indigestion  magnesium hydroxide Suspension 30 milliLiter(s) Oral daily PRN Constipation  ondansetron Injectable 4 milliGRAM(s) IV Push every 6 hours PRN Nausea and/or Vomiting  oxyCODONE    IR 2.5 milliGRAM(s) Oral every 3 hours PRN Moderate Pain (4 - 6)  oxyCODONE    IR 5 milliGRAM(s) Oral every 3 hours PRN Severe Pain (7 - 10)  traMADol 25 milliGRAM(s) Oral every 6 hours PRN Mild Pain (1 - 3)    Allergies    Allergy Status Unknown    Intolerances      REVIEW OF SYSTEMS:  All other systems reviewed and are negative    Vital Signs Last 24 Hrs  T(C): 36.4 (10 Feb 2025 11:10), Max: 36.8 (09 Feb 2025 23:32)  T(F): 97.6 (10 Feb 2025 11:10), Max: 98.2 (09 Feb 2025 23:32)  HR: 88 (10 Feb 2025 11:10) (80 - 88)  BP: 115/72 (10 Feb 2025 11:10) (115/72 - 126/73)  BP(mean): --  RR: 18 (10 Feb 2025 11:10) (17 - 18)  SpO2: 94% (10 Feb 2025 11:10) (94% - 96%)    Parameters below as of 10 Feb 2025 11:10  Patient On (Oxygen Delivery Method): room air      Daily     Daily   I&O's Summary    09 Feb 2025 07:01  -  10 Feb 2025 07:00  --------------------------------------------------------  IN: 840 mL / OUT: 1375 mL / NET: -535 mL      CAPILLARY BLOOD GLUCOSE        PHYSICAL EXAM:  GENERAL: NAD,    HEAD:  Atraumatic, Normocephalic  EYES: EOMI, PERRLA, conjunctiva and sclera clear  ENMT: No tonsillar erythema, exudates, or enlargement; Moist mucous membranes, Good dentition, No lesions  NECK: Supple, No JVD, Normal thyroid  NERVOUS SYSTEM:  Alert & Oriented X3, Good concentration; Motor Strength 5/5 B/L upper and lower extremities; DTRs 2+ intact and symmetric  CHEST/LUNG: Clear to percussion bilaterally; No rales, rhonchi, wheezing, or rubs  HEART: Regular rate and rhythm; No murmurs, rubs, or gallops  ABDOMEN: Soft, Nontender, Nondistended; Bowel sounds present  EXTREMITIES:  2+ Peripheral Pulses, No clubbing, cyanosis, or edema  LYMPH: No lymphadenopathy noted  SKIN: No rashes or lesions    Labs                          11.2   7.31  )-----------( 193      ( 10 Feb 2025 06:30 )             32.8     02-10    140  |  110[H]  |  19  ----------------------------<  96  4.6   |  27  |  0.88    Ca    8.7      10 Feb 2025 06:30            Urinalysis Basic - ( 10 Feb 2025 06:30 )    Color: x / Appearance: x / SG: x / pH: x  Gluc: 96 mg/dL / Ketone: x  / Bili: x / Urobili: x   Blood: x / Protein: x / Nitrite: x   Leuk Esterase: x / RBC: x / WBC x   Sq Epi: x / Non Sq Epi: x / Bacteria: x                  DVT prophylaxis: > Lovenox 40mg SQ daily  > Heparin   > SCD's

## 2025-02-10 NOTE — PROGRESS NOTE ADULT - ASSESSMENT
84 year old male with PMHx of HTN, HLD, and BPH who presented to the ED on 2/6/25 for complaints of abnormal imaging and admitted for right femoral neck fracture.    R. femoral neck fracture s/p mechanical fall  POD2 R JENNY    awaiting melina placement  care per orthopedics       Chronic medical conditions:  HTN: recommend continuing PTA amlodipine  HLD: recommend continuing PTA atorvastatin  BPH: recommend continuing PTA tamsulosin
84 year old male with PMHx of HTN, HLD, and BPH who presented to the ED on 2/6/25 for complaints of abnormal imaging and admitted for right femoral neck fracture.    R. femoral neck fracture s/p mechanical fall  POD3 R JENNY    awaiting melina placement  care per orthopedics       Chronic medical conditions:  HTN: recommend continuing PTA amlodipine  HLD: recommend continuing PTA atorvastatin  BPH: recommend continuing PTA tamsulosin
84 year old male with PMHx of HTN, HLD, and BPH who presented to the ED on 2/6/25 for complaints of abnormal imaging and admitted for right femoral neck fracture.    R. femoral neck fracture s/p mechanical fall  Reports he fell inside his house on Tuesday afternoon, landed on right hand and right hip, denies head hit  Iced area for 30 minutes and took an Aleve  Did not think much of it as able to ambulate unassisted   Underwent outpatient R. hip x-ray  which revealed R. femoral neck fracture  Plan for OR for cornel vs. total hip replacement as per primary ortho team  DVT ppx and pain management as per primary ortho team  PT/OT post-op    Medical optimization  Procedure is considered intermediate risk  RCRI 0 which is class I, 0.4% risk of cardiac death, nonfatal MI, and nonfatal cardiac arrest in patient undergoing noncardiac surgery  DASI > 4 METs  No absolute contraindication for procedure      Chronic medical conditions:  HTN: recommend continuing PTA amlodipine  HLD: recommend continuing PTA atorvastatin  BPH: recommend continuing PTA tamsulosin    
84 year old male with PMHx of HTN, HLD, and BPH who presented to the ED on 2/6/25 for complaints of abnormal imaging and admitted for right femoral neck fracture.    R. femoral neck fracture s/p mechanical fall  POD1 R JENNY    awaiting melina placement  care per orthopedics       Chronic medical conditions:  HTN: recommend continuing PTA amlodipine  HLD: recommend continuing PTA atorvastatin  BPH: recommend continuing PTA tamsulosin

## 2025-02-10 NOTE — PROGRESS NOTE ADULT - SUBJECTIVE AND OBJECTIVE BOX
Progress Note    Patient examined at the bedside. Laying comfortably, in no acute distress. Denies any new pain, numbness, tingling down RLE. Denies any new chest pain, SOB, N/V, or bladder and bowel symptoms.    LABS:                        11.0   8.43  )-----------( 178      ( 09 Feb 2025 08:10 )             31.7     02-09    139  |  109[H]  |  23  ----------------------------<  103[H]  4.7   |  25  |  0.96    Ca    8.8      09 Feb 2025 08:10      VITALS:  T(C): 36.4 (02-10-25 @ 05:11), Max: 36.8 (02-09-25 @ 23:32)  HR: 81 (02-10-25 @ 05:11) (80 - 91)  BP: 118/72 (02-10-25 @ 05:11) (117/68 - 133/73)  RR: 18 (02-10-25 @ 05:11) (17 - 19)  SpO2: 96% (02-10-25 @ 05:11) (94% - 96%)    PHYSICAL EXAM:    General: NAD, resting comfortably in bed  RLE:   Dressing C/D/I  SCDs present bilaterally  Compartments soft and compressible  No calf tenderness bilaterally  Motor: +TA/EHL/FHL/GSC  Sensory: +SILT SPN/DPN/MIRNA/SAPH/TIB  + DP    ASSESSMENT AND PLAN:  84y m s/p R JENNY POD 3    -PT/OT  -WBAT, Anterior hip precautions  -Pain Control  -DVT ppx: aspirin 81mg BID   -FU AM Labs  -Rest, ice, compress as tolerated  -Incentive Spirometry  -Medical management appreciated  -Dispo per PT - MERRITT    To discuss with Dr. Issa for any further recommendations and management

## 2025-02-11 ENCOUNTER — TRANSCRIPTION ENCOUNTER (OUTPATIENT)
Age: 85
End: 2025-02-11

## 2025-02-11 VITALS
SYSTOLIC BLOOD PRESSURE: 128 MMHG | HEART RATE: 85 BPM | DIASTOLIC BLOOD PRESSURE: 72 MMHG | OXYGEN SATURATION: 95 % | TEMPERATURE: 98 F | RESPIRATION RATE: 20 BRPM

## 2025-02-11 LAB
ANION GAP SERPL CALC-SCNC: 4 MMOL/L — LOW (ref 5–17)
BUN SERPL-MCNC: 22 MG/DL — SIGNIFICANT CHANGE UP (ref 7–23)
CALCIUM SERPL-MCNC: 8.6 MG/DL — SIGNIFICANT CHANGE UP (ref 8.5–10.1)
CHLORIDE SERPL-SCNC: 108 MMOL/L — SIGNIFICANT CHANGE UP (ref 96–108)
CO2 SERPL-SCNC: 27 MMOL/L — SIGNIFICANT CHANGE UP (ref 22–31)
CREAT SERPL-MCNC: 0.88 MG/DL — SIGNIFICANT CHANGE UP (ref 0.5–1.3)
EGFR: 85 ML/MIN/1.73M2 — SIGNIFICANT CHANGE UP
GLUCOSE SERPL-MCNC: 103 MG/DL — HIGH (ref 70–99)
HCT VFR BLD CALC: 31.5 % — LOW (ref 39–50)
HGB BLD-MCNC: 10.7 G/DL — LOW (ref 13–17)
MCHC RBC-ENTMCNC: 32 PG — SIGNIFICANT CHANGE UP (ref 27–34)
MCHC RBC-ENTMCNC: 34 G/DL — SIGNIFICANT CHANGE UP (ref 32–36)
MCV RBC AUTO: 94.3 FL — SIGNIFICANT CHANGE UP (ref 80–100)
NRBC BLD AUTO-RTO: 0 /100 WBCS — SIGNIFICANT CHANGE UP (ref 0–0)
PLATELET # BLD AUTO: 212 K/UL — SIGNIFICANT CHANGE UP (ref 150–400)
POTASSIUM SERPL-MCNC: 3.9 MMOL/L — SIGNIFICANT CHANGE UP (ref 3.5–5.3)
POTASSIUM SERPL-SCNC: 3.9 MMOL/L — SIGNIFICANT CHANGE UP (ref 3.5–5.3)
RBC # BLD: 3.34 M/UL — LOW (ref 4.2–5.8)
RBC # FLD: 13.2 % — SIGNIFICANT CHANGE UP (ref 10.3–14.5)
SODIUM SERPL-SCNC: 139 MMOL/L — SIGNIFICANT CHANGE UP (ref 135–145)
WBC # BLD: 7.23 K/UL — SIGNIFICANT CHANGE UP (ref 3.8–10.5)
WBC # FLD AUTO: 7.23 K/UL — SIGNIFICANT CHANGE UP (ref 3.8–10.5)

## 2025-02-11 RX ADMIN — Medication 5 MILLIGRAM(S): at 11:44

## 2025-02-11 RX ADMIN — OXYBUTYNIN CHLORIDE 5 MILLIGRAM(S): 5 TABLET, EXTENDED RELEASE ORAL at 05:42

## 2025-02-11 RX ADMIN — TAMSULOSIN HYDROCHLORIDE 0.4 MILLIGRAM(S): 0.4 CAPSULE ORAL at 05:41

## 2025-02-11 RX ADMIN — ACETAMINOPHEN 1000 MILLIGRAM(S): 160 SUSPENSION ORAL at 05:42

## 2025-02-11 RX ADMIN — PANTOPRAZOLE 40 MILLIGRAM(S): 20 TABLET, DELAYED RELEASE ORAL at 06:49

## 2025-02-11 RX ADMIN — ACETAMINOPHEN 1000 MILLIGRAM(S): 160 SUSPENSION ORAL at 13:49

## 2025-02-11 RX ADMIN — ASPIRIN 81 MILLIGRAM(S): 81 TABLET, COATED ORAL at 05:42

## 2025-02-11 RX ADMIN — Medication 1 TABLET(S): at 11:45

## 2025-02-11 RX ADMIN — Medication 5 MILLIGRAM(S): at 05:42

## 2025-02-11 NOTE — DISCHARGE NOTE NURSING/CASE MANAGEMENT/SOCIAL WORK - PATIENT PORTAL LINK FT
You can access the FollowMyHealth Patient Portal offered by [facility] by registering at the following website: [email or web address]. By joining [employer]’s FollowMyHealth portal, you will also be able to view your health information using other applications (apps) compatible with our system.

## 2025-02-11 NOTE — DISCHARGE NOTE NURSING/CASE MANAGEMENT/SOCIAL WORK - NSDCPEPT PROEDMA_GEN_ALL_CORE
Yes none Plan: Apply mupirocin daily and occlude with hypafix. Detail Level: Zone Initiate Treatment: Mupirocin

## 2025-02-11 NOTE — PROGRESS NOTE ADULT - PROVIDER SPECIALTY LIST ADULT
Hospitalist
Hospitalist
Orthopedics
Hospitalist
Orthopedics
Hospitalist

## 2025-02-11 NOTE — PROGRESS NOTE ADULT - SUBJECTIVE AND OBJECTIVE BOX
Progress Note    Patient examined at the bedside. Laying comfortably, in no acute distress. Denies any new pain, numbness, tingling down RLE. Denies any new chest pain, SOB, N/V, or bladder and bowel symptoms.    LABS:                        11.2   7.31  )-----------( 193      ( 10 Feb 2025 06:30 )             32.8     02-10    140  |  110[H]  |  19  ----------------------------<  96  4.6   |  27  |  0.88    Ca    8.7      10 Feb 2025 06:30        VITALS:  T(C): 36.5 (02-11-25 @ 05:15), Max: 36.7 (02-10-25 @ 23:35)  HR: 77 (02-11-25 @ 05:15) (77 - 95)  BP: 115/73 (02-11-25 @ 05:15) (103/65 - 134/73)  RR: 17 (02-11-25 @ 05:15) (16 - 18)  SpO2: 97% (02-11-25 @ 05:15) (93% - 97%)    PHYSICAL EXAM:  General: NAD, resting comfortably in bed  RLE:   Dressing C/D/I  SCDs present bilaterally  Compartments soft and compressible  No calf tenderness bilaterally  Motor: +TA/EHL/FHL/GSC  Sensory: +SILT SPN/DPN/MIRNA/SAPH/TIB  + DP    ASSESSMENT AND PLAN:  84y m s/p R JENNY POD 4    -PT/OT  -WBAT, Anterior hip precautions  -Pain Control  -DVT ppx: aspirin 81mg BID   -FU AM Labs  -Rest, ice, compress as tolerated  -Incentive Spirometry  -Medical management appreciated  -Dispo per PT - MERRITT    To discuss with Dr. Issa for any further recommendations and management

## 2025-02-11 NOTE — PROGRESS NOTE ADULT - REASON FOR ADMISSION
R Femoral Neck Fracture

## 2025-02-11 NOTE — DISCHARGE NOTE NURSING/CASE MANAGEMENT/SOCIAL WORK - NSDCPEFALRISK_GEN_ALL_CORE
For information on Fall & Injury Prevention, visit: [email or web address] OR  [email or web address] OR  https://www.cdc.gov/steadi/patient.html

## 2025-02-11 NOTE — DISCHARGE NOTE NURSING/CASE MANAGEMENT/SOCIAL WORK - FINANCIAL ASSISTANCE
[facility] provides services at a reduced cost to those who are determined to be eligible through [facility]’s financial assistance program. Information regarding [facility]’s financial assistance program can be found by going to [email or web address] or by calling [phone].

## 2025-02-14 DIAGNOSIS — E78.5 HYPERLIPIDEMIA, UNSPECIFIED: ICD-10-CM
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
## 2025-02-14 DIAGNOSIS — S72.001A FRACTURE OF UNSPECIFIED PART OF NECK OF RIGHT FEMUR, INITIAL ENCOUNTER FOR CLOSED FRACTURE: ICD-10-CM

## 2025-02-14 DIAGNOSIS — I10 ESSENTIAL (PRIMARY) HYPERTENSION: ICD-10-CM
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
## 2025-02-14 DIAGNOSIS — N40.0 BENIGN PROSTATIC HYPERPLASIA WITHOUT LOWER URINARY TRACT SYMPTOMS: ICD-10-CM

## 2025-02-19 ENCOUNTER — INPATIENT (INPATIENT)
Facility: HOSPITAL | Age: 85
LOS: 2 days | Discharge: HOME HEALTH | End: 2025-02-22
Attending: INTERNAL MEDICINE | Admitting: INTERNAL MEDICINE
Payer: MEDICARE

## 2025-02-19 VITALS
WEIGHT: 172.84 LBS | OXYGEN SATURATION: 94 % | TEMPERATURE: 99 F | HEIGHT: 70 IN | SYSTOLIC BLOOD PRESSURE: 119 MMHG | RESPIRATION RATE: 18 BRPM | DIASTOLIC BLOOD PRESSURE: 82 MMHG | HEART RATE: 108 BPM

## 2025-02-19 LAB
ALBUMIN SERPL ELPH-MCNC: 2.8 G/DL — LOW (ref 3.3–5)
ALP SERPL-CCNC: 106 U/L — SIGNIFICANT CHANGE UP (ref 40–120)
ALT FLD-CCNC: 19 U/L — SIGNIFICANT CHANGE UP (ref 12–78)
ANION GAP SERPL CALC-SCNC: 6 MMOL/L — SIGNIFICANT CHANGE UP (ref 5–17)
APPEARANCE UR: ABNORMAL
APTT BLD: 28.9 SEC — SIGNIFICANT CHANGE UP (ref 24.5–35.6)
AST SERPL-CCNC: 27 U/L — SIGNIFICANT CHANGE UP (ref 15–37)
BACTERIA # UR AUTO: ABNORMAL /HPF
BASOPHILS # BLD AUTO: 0.03 K/UL — SIGNIFICANT CHANGE UP (ref 0–0.2)
BASOPHILS NFR BLD AUTO: 0.2 % — SIGNIFICANT CHANGE UP (ref 0–2)
BILIRUB SERPL-MCNC: 0.6 MG/DL — SIGNIFICANT CHANGE UP (ref 0.2–1.2)
BILIRUB UR-MCNC: NEGATIVE — SIGNIFICANT CHANGE UP
BUN SERPL-MCNC: 22 MG/DL — SIGNIFICANT CHANGE UP (ref 7–23)
CALCIUM SERPL-MCNC: 8.7 MG/DL — SIGNIFICANT CHANGE UP (ref 8.5–10.1)
CHLORIDE SERPL-SCNC: 108 MMOL/L — SIGNIFICANT CHANGE UP (ref 96–108)
CO2 SERPL-SCNC: 24 MMOL/L — SIGNIFICANT CHANGE UP (ref 22–31)
COLOR SPEC: YELLOW — SIGNIFICANT CHANGE UP
CREAT SERPL-MCNC: 0.89 MG/DL — SIGNIFICANT CHANGE UP (ref 0.5–1.3)
DIFF PNL FLD: ABNORMAL
EGFR: 85 ML/MIN/1.73M2 — SIGNIFICANT CHANGE UP
EGFR: 85 ML/MIN/1.73M2 — SIGNIFICANT CHANGE UP
EOSINOPHIL # BLD AUTO: 0.02 K/UL — SIGNIFICANT CHANGE UP (ref 0–0.5)
EOSINOPHIL NFR BLD AUTO: 0.1 % — SIGNIFICANT CHANGE UP (ref 0–6)
EPI CELLS # UR: PRESENT
GLUCOSE SERPL-MCNC: 105 MG/DL — HIGH (ref 70–99)
GLUCOSE UR QL: NEGATIVE MG/DL — SIGNIFICANT CHANGE UP
HCT VFR BLD CALC: 30.5 % — LOW (ref 39–50)
HGB BLD-MCNC: 10.5 G/DL — LOW (ref 13–17)
IMM GRANULOCYTES NFR BLD AUTO: 0.5 % — SIGNIFICANT CHANGE UP (ref 0–0.9)
INR BLD: 1.07 RATIO — SIGNIFICANT CHANGE UP (ref 0.85–1.16)
KETONES UR-MCNC: ABNORMAL MG/DL
LACTATE SERPL-SCNC: 1.1 MMOL/L — SIGNIFICANT CHANGE UP (ref 0.7–2)
LEUKOCYTE ESTERASE UR-ACNC: ABNORMAL
LYMPHOCYTES # BLD AUTO: 0.25 K/UL — LOW (ref 1–3.3)
LYMPHOCYTES # BLD AUTO: 1.4 % — LOW (ref 13–44)
MCHC RBC-ENTMCNC: 32.5 PG — SIGNIFICANT CHANGE UP (ref 27–34)
MCHC RBC-ENTMCNC: 34.4 G/DL — SIGNIFICANT CHANGE UP (ref 32–36)
MCV RBC AUTO: 94.4 FL — SIGNIFICANT CHANGE UP (ref 80–100)
MONOCYTES # BLD AUTO: 0.29 K/UL — SIGNIFICANT CHANGE UP (ref 0–0.9)
MONOCYTES NFR BLD AUTO: 1.7 % — LOW (ref 2–14)
NEUTROPHILS # BLD AUTO: 16.71 K/UL — HIGH (ref 1.8–7.4)
NEUTROPHILS NFR BLD AUTO: 96.1 % — HIGH (ref 43–77)
NITRITE UR-MCNC: POSITIVE
NRBC BLD AUTO-RTO: 0 /100 WBCS — SIGNIFICANT CHANGE UP (ref 0–0)
PH UR: 7.5 — SIGNIFICANT CHANGE UP (ref 5–8)
PLATELET # BLD AUTO: 295 K/UL — SIGNIFICANT CHANGE UP (ref 150–400)
POTASSIUM SERPL-MCNC: 3.7 MMOL/L — SIGNIFICANT CHANGE UP (ref 3.5–5.3)
POTASSIUM SERPL-SCNC: 3.7 MMOL/L — SIGNIFICANT CHANGE UP (ref 3.5–5.3)
PROT SERPL-MCNC: 6.1 GM/DL — SIGNIFICANT CHANGE UP (ref 6–8.3)
PROT UR-MCNC: 30 MG/DL
PROTHROM AB SERPL-ACNC: 12.4 SEC — SIGNIFICANT CHANGE UP (ref 9.9–13.4)
RAPID RVP RESULT: SIGNIFICANT CHANGE UP
RBC # BLD: 3.23 M/UL — LOW (ref 4.2–5.8)
RBC # FLD: 13.4 % — SIGNIFICANT CHANGE UP (ref 10.3–14.5)
RBC CASTS # UR COMP ASSIST: 10 /HPF — HIGH (ref 0–4)
SARS-COV-2 RNA SPEC QL NAA+PROBE: SIGNIFICANT CHANGE UP
SODIUM SERPL-SCNC: 138 MMOL/L — SIGNIFICANT CHANGE UP (ref 135–145)
SP GR SPEC: 1.02 — SIGNIFICANT CHANGE UP (ref 1–1.03)
UROBILINOGEN FLD QL: 2 MG/DL (ref 0.2–1)
WBC # BLD: 17.39 K/UL — HIGH (ref 3.8–10.5)
WBC # FLD AUTO: 17.39 K/UL — HIGH (ref 3.8–10.5)
WBC UR QL: 30 /HPF — HIGH (ref 0–5)

## 2025-02-19 PROCEDURE — 71275 CT ANGIOGRAPHY CHEST: CPT | Mod: 26

## 2025-02-19 PROCEDURE — 71045 X-RAY EXAM CHEST 1 VIEW: CPT | Mod: 26

## 2025-02-19 PROCEDURE — 93010 ELECTROCARDIOGRAM REPORT: CPT

## 2025-02-19 PROCEDURE — 74177 CT ABD & PELVIS W/CONTRAST: CPT | Mod: 26

## 2025-02-19 PROCEDURE — 99285 EMERGENCY DEPT VISIT HI MDM: CPT

## 2025-02-19 RX ORDER — MELATONIN 5 MG
3 TABLET ORAL AT BEDTIME
Refills: 0 | Status: DISCONTINUED | OUTPATIENT
Start: 2025-02-19 | End: 2025-02-22

## 2025-02-19 RX ORDER — ACETAMINOPHEN 500 MG/5ML
1000 LIQUID (ML) ORAL ONCE
Refills: 0 | Status: COMPLETED | OUTPATIENT
Start: 2025-02-19 | End: 2025-02-19

## 2025-02-19 RX ORDER — ONDANSETRON HCL/PF 4 MG/2 ML
4 VIAL (ML) INJECTION EVERY 8 HOURS
Refills: 0 | Status: DISCONTINUED | OUTPATIENT
Start: 2025-02-19 | End: 2025-02-22

## 2025-02-19 RX ORDER — MAGNESIUM, ALUMINUM HYDROXIDE 200-200 MG
30 TABLET,CHEWABLE ORAL EVERY 4 HOURS
Refills: 0 | Status: DISCONTINUED | OUTPATIENT
Start: 2025-02-19 | End: 2025-02-22

## 2025-02-19 RX ORDER — CEFTRIAXONE 500 MG/1
1000 INJECTION, POWDER, FOR SOLUTION INTRAMUSCULAR; INTRAVENOUS ONCE
Refills: 0 | Status: COMPLETED | OUTPATIENT
Start: 2025-02-19 | End: 2025-02-19

## 2025-02-19 RX ORDER — SODIUM CHLORIDE 9 G/1000ML
1000 INJECTION, SOLUTION INTRAVENOUS ONCE
Refills: 0 | Status: COMPLETED | OUTPATIENT
Start: 2025-02-19 | End: 2025-02-19

## 2025-02-19 RX ORDER — CEFTRIAXONE 500 MG/1
1000 INJECTION, POWDER, FOR SOLUTION INTRAMUSCULAR; INTRAVENOUS EVERY 24 HOURS
Refills: 0 | Status: DISCONTINUED | OUTPATIENT
Start: 2025-02-20 | End: 2025-02-20

## 2025-02-19 RX ORDER — ACETAMINOPHEN 500 MG/5ML
650 LIQUID (ML) ORAL EVERY 6 HOURS
Refills: 0 | Status: DISCONTINUED | OUTPATIENT
Start: 2025-02-19 | End: 2025-02-22

## 2025-02-19 RX ADMIN — Medication 75 MILLILITER(S): at 23:50

## 2025-02-19 RX ADMIN — SODIUM CHLORIDE 1000 MILLILITER(S): 9 INJECTION, SOLUTION INTRAVENOUS at 20:25

## 2025-02-19 RX ADMIN — CEFTRIAXONE 100 MILLIGRAM(S): 500 INJECTION, POWDER, FOR SOLUTION INTRAMUSCULAR; INTRAVENOUS at 23:47

## 2025-02-19 RX ADMIN — Medication 1000 MILLILITER(S): at 17:53

## 2025-02-19 RX ADMIN — CEFTRIAXONE 100 MILLIGRAM(S): 500 INJECTION, POWDER, FOR SOLUTION INTRAMUSCULAR; INTRAVENOUS at 18:59

## 2025-02-19 RX ADMIN — Medication 400 MILLIGRAM(S): at 17:54

## 2025-02-20 DIAGNOSIS — N39.0 URINARY TRACT INFECTION, SITE NOT SPECIFIED: ICD-10-CM

## 2025-02-20 DIAGNOSIS — A41.9 SEPSIS, UNSPECIFIED ORGANISM: ICD-10-CM

## 2025-02-20 PROBLEM — E78.5 HYPERLIPIDEMIA, UNSPECIFIED: Chronic | Status: ACTIVE | Noted: 2025-02-06

## 2025-02-20 PROBLEM — N40.0 BENIGN PROSTATIC HYPERPLASIA WITHOUT LOWER URINARY TRACT SYMPTOMS: Chronic | Status: ACTIVE | Noted: 2025-02-06

## 2025-02-20 PROBLEM — I10 ESSENTIAL (PRIMARY) HYPERTENSION: Chronic | Status: ACTIVE | Noted: 2025-02-06

## 2025-02-20 LAB
ALBUMIN SERPL ELPH-MCNC: 2.3 G/DL — LOW (ref 3.3–5)
ALP SERPL-CCNC: 98 U/L — SIGNIFICANT CHANGE UP (ref 40–120)
ALT FLD-CCNC: 16 U/L — SIGNIFICANT CHANGE UP (ref 12–78)
ANION GAP SERPL CALC-SCNC: 6 MMOL/L — SIGNIFICANT CHANGE UP (ref 5–17)
ANISOCYTOSIS BLD QL: SLIGHT — SIGNIFICANT CHANGE UP
AST SERPL-CCNC: 28 U/L — SIGNIFICANT CHANGE UP (ref 15–37)
BASOPHILS # BLD AUTO: 0 K/UL — SIGNIFICANT CHANGE UP (ref 0–0.2)
BASOPHILS NFR BLD AUTO: 0 % — SIGNIFICANT CHANGE UP (ref 0–2)
BILIRUB SERPL-MCNC: 0.6 MG/DL — SIGNIFICANT CHANGE UP (ref 0.2–1.2)
BUN SERPL-MCNC: 16 MG/DL — SIGNIFICANT CHANGE UP (ref 7–23)
CALCIUM SERPL-MCNC: 8 MG/DL — LOW (ref 8.5–10.1)
CHLORIDE SERPL-SCNC: 111 MMOL/L — HIGH (ref 96–108)
CO2 SERPL-SCNC: 23 MMOL/L — SIGNIFICANT CHANGE UP (ref 22–31)
CREAT SERPL-MCNC: 0.72 MG/DL — SIGNIFICANT CHANGE UP (ref 0.5–1.3)
EGFR: 91 ML/MIN/1.73M2 — SIGNIFICANT CHANGE UP
EGFR: 91 ML/MIN/1.73M2 — SIGNIFICANT CHANGE UP
EOSINOPHIL # BLD AUTO: 0 K/UL — SIGNIFICANT CHANGE UP (ref 0–0.5)
EOSINOPHIL NFR BLD AUTO: 0 % — SIGNIFICANT CHANGE UP (ref 0–6)
GLUCOSE SERPL-MCNC: 97 MG/DL — SIGNIFICANT CHANGE UP (ref 70–99)
HCT VFR BLD CALC: 27.5 % — LOW (ref 39–50)
HCT VFR BLD CALC: 28.2 % — LOW (ref 39–50)
HGB BLD-MCNC: 9.5 G/DL — LOW (ref 13–17)
HGB BLD-MCNC: 9.5 G/DL — LOW (ref 13–17)
HYPOCHROMIA BLD QL: SLIGHT — SIGNIFICANT CHANGE UP
LYMPHOCYTES # BLD AUTO: 0.76 K/UL — LOW (ref 1–3.3)
LYMPHOCYTES # BLD AUTO: 3 % — LOW (ref 13–44)
MANUAL SMEAR VERIFICATION: SIGNIFICANT CHANGE UP
MCHC RBC-ENTMCNC: 32.3 PG — SIGNIFICANT CHANGE UP (ref 27–34)
MCHC RBC-ENTMCNC: 32.4 PG — SIGNIFICANT CHANGE UP (ref 27–34)
MCHC RBC-ENTMCNC: 33.7 G/DL — SIGNIFICANT CHANGE UP (ref 32–36)
MCHC RBC-ENTMCNC: 34.5 G/DL — SIGNIFICANT CHANGE UP (ref 32–36)
MCV RBC AUTO: 93.9 FL — SIGNIFICANT CHANGE UP (ref 80–100)
MCV RBC AUTO: 95.9 FL — SIGNIFICANT CHANGE UP (ref 80–100)
MONOCYTES # BLD AUTO: 0.76 K/UL — SIGNIFICANT CHANGE UP (ref 0–0.9)
MONOCYTES NFR BLD AUTO: 3 % — SIGNIFICANT CHANGE UP (ref 2–14)
NEUTROPHILS # BLD AUTO: 23.82 K/UL — HIGH (ref 1.8–7.4)
NEUTROPHILS NFR BLD AUTO: 87 % — HIGH (ref 43–77)
NEUTS BAND # BLD: 7 % — SIGNIFICANT CHANGE UP (ref 0–8)
NEUTS BAND NFR BLD: 7 % — SIGNIFICANT CHANGE UP (ref 0–8)
NRBC # BLD: 0 /100 WBCS — SIGNIFICANT CHANGE UP (ref 0–0)
NRBC BLD AUTO-RTO: 0 /100 WBCS — SIGNIFICANT CHANGE UP (ref 0–0)
NRBC BLD AUTO-RTO: SIGNIFICANT CHANGE UP /100 WBCS (ref 0–0)
NRBC BLD-RTO: 0 /100 WBCS — SIGNIFICANT CHANGE UP (ref 0–0)
OVALOCYTES BLD QL SMEAR: SLIGHT — SIGNIFICANT CHANGE UP
PLAT MORPH BLD: NORMAL — SIGNIFICANT CHANGE UP
PLATELET # BLD AUTO: 226 K/UL — SIGNIFICANT CHANGE UP (ref 150–400)
PLATELET # BLD AUTO: 275 K/UL — SIGNIFICANT CHANGE UP (ref 150–400)
POTASSIUM SERPL-MCNC: 3.4 MMOL/L — LOW (ref 3.5–5.3)
POTASSIUM SERPL-SCNC: 3.4 MMOL/L — LOW (ref 3.5–5.3)
PROT SERPL-MCNC: 5.4 GM/DL — LOW (ref 6–8.3)
RBC # BLD: 2.93 M/UL — LOW (ref 4.2–5.8)
RBC # BLD: 2.94 M/UL — LOW (ref 4.2–5.8)
RBC # FLD: 14.1 % — SIGNIFICANT CHANGE UP (ref 10.3–14.5)
RBC # FLD: 14.3 % — SIGNIFICANT CHANGE UP (ref 10.3–14.5)
RBC BLD AUTO: SIGNIFICANT CHANGE UP
SODIUM SERPL-SCNC: 140 MMOL/L — SIGNIFICANT CHANGE UP (ref 135–145)
TOXIC GRANULES BLD QL SMEAR: PRESENT — SIGNIFICANT CHANGE UP
WBC # BLD: 17.3 K/UL — HIGH (ref 3.8–10.5)
WBC # BLD: 25.34 K/UL — HIGH (ref 3.8–10.5)
WBC # FLD AUTO: 17.3 K/UL — HIGH (ref 3.8–10.5)
WBC # FLD AUTO: 25.34 K/UL — HIGH (ref 3.8–10.5)

## 2025-02-20 PROCEDURE — G0545: CPT

## 2025-02-20 PROCEDURE — 99222 1ST HOSP IP/OBS MODERATE 55: CPT

## 2025-02-20 PROCEDURE — 99223 1ST HOSP IP/OBS HIGH 75: CPT

## 2025-02-20 PROCEDURE — 99232 SBSQ HOSP IP/OBS MODERATE 35: CPT

## 2025-02-20 RX ORDER — AMLODIPINE BESYLATE 5 MG
1 TABLET ORAL
Refills: 0 | DISCHARGE

## 2025-02-20 RX ORDER — MONTELUKAST SODIUM 10 MG/1
10 TABLET ORAL AT BEDTIME
Refills: 0 | Status: DISCONTINUED | OUTPATIENT
Start: 2025-02-20 | End: 2025-02-22

## 2025-02-20 RX ORDER — PIPERACILLIN-TAZO-DEXTROSE,ISO 3.375G/5
3.38 IV SOLUTION, PIGGYBACK PREMIX FROZEN(ML) INTRAVENOUS ONCE
Refills: 0 | Status: COMPLETED | OUTPATIENT
Start: 2025-02-20 | End: 2025-02-20

## 2025-02-20 RX ORDER — ASPIRIN 325 MG
81 TABLET ORAL DAILY
Refills: 0 | Status: DISCONTINUED | OUTPATIENT
Start: 2025-02-20 | End: 2025-02-22

## 2025-02-20 RX ORDER — PIPERACILLIN-TAZO-DEXTROSE,ISO 3.375G/5
3.38 IV SOLUTION, PIGGYBACK PREMIX FROZEN(ML) INTRAVENOUS ONCE
Refills: 0 | Status: COMPLETED | OUTPATIENT
Start: 2025-02-21 | End: 2025-02-21

## 2025-02-20 RX ORDER — ENOXAPARIN SODIUM 100 MG/ML
40 INJECTION SUBCUTANEOUS EVERY 24 HOURS
Refills: 0 | Status: DISCONTINUED | OUTPATIENT
Start: 2025-02-20 | End: 2025-02-22

## 2025-02-20 RX ORDER — AMLODIPINE BESYLATE 10 MG/1
5 TABLET ORAL DAILY
Refills: 0 | Status: DISCONTINUED | OUTPATIENT
Start: 2025-02-20 | End: 2025-02-22

## 2025-02-20 RX ORDER — PIPERACILLIN-TAZO-DEXTROSE,ISO 3.375G/5
3.38 IV SOLUTION, PIGGYBACK PREMIX FROZEN(ML) INTRAVENOUS EVERY 8 HOURS
Refills: 0 | Status: DISCONTINUED | OUTPATIENT
Start: 2025-02-21 | End: 2025-02-22

## 2025-02-20 RX ORDER — OXYBUTYNIN CHLORIDE 5 MG/1
5 TABLET, FILM COATED, EXTENDED RELEASE ORAL
Refills: 0 | Status: DISCONTINUED | OUTPATIENT
Start: 2025-02-20 | End: 2025-02-22

## 2025-02-20 RX ORDER — TAMSULOSIN HYDROCHLORIDE 0.4 MG/1
0.4 CAPSULE ORAL AT BEDTIME
Refills: 0 | Status: DISCONTINUED | OUTPATIENT
Start: 2025-02-20 | End: 2025-02-22

## 2025-02-20 RX ORDER — ATORVASTATIN CALCIUM 80 MG/1
10 TABLET, FILM COATED ORAL AT BEDTIME
Refills: 0 | Status: DISCONTINUED | OUTPATIENT
Start: 2025-02-20 | End: 2025-02-22

## 2025-02-20 RX ADMIN — AMLODIPINE BESYLATE 5 MILLIGRAM(S): 10 TABLET ORAL at 06:08

## 2025-02-20 RX ADMIN — Medication 40 MILLIEQUIVALENT(S): at 13:44

## 2025-02-20 RX ADMIN — ENOXAPARIN SODIUM 40 MILLIGRAM(S): 100 INJECTION SUBCUTANEOUS at 10:05

## 2025-02-20 RX ADMIN — Medication 40 MILLIEQUIVALENT(S): at 10:05

## 2025-02-20 RX ADMIN — Medication 81 MILLIGRAM(S): at 11:53

## 2025-02-20 RX ADMIN — Medication 25 GRAM(S): at 18:36

## 2025-02-20 RX ADMIN — OXYBUTYNIN CHLORIDE 5 MILLIGRAM(S): 5 TABLET, FILM COATED, EXTENDED RELEASE ORAL at 18:36

## 2025-02-20 RX ADMIN — Medication 200 GRAM(S): at 16:37

## 2025-02-20 RX ADMIN — TAMSULOSIN HYDROCHLORIDE 0.4 MILLIGRAM(S): 0.4 CAPSULE ORAL at 21:15

## 2025-02-20 RX ADMIN — MONTELUKAST SODIUM 10 MILLIGRAM(S): 10 TABLET ORAL at 21:15

## 2025-02-20 RX ADMIN — ATORVASTATIN CALCIUM 10 MILLIGRAM(S): 80 TABLET, FILM COATED ORAL at 21:15

## 2025-02-20 RX ADMIN — OXYBUTYNIN CHLORIDE 5 MILLIGRAM(S): 5 TABLET, FILM COATED, EXTENDED RELEASE ORAL at 06:07

## 2025-02-20 RX ADMIN — Medication 40 MILLIGRAM(S): at 06:06

## 2025-02-20 RX ADMIN — Medication 5 MILLIGRAM(S): at 12:22

## 2025-02-21 ENCOUNTER — TRANSCRIPTION ENCOUNTER (OUTPATIENT)
Age: 85
End: 2025-02-21

## 2025-02-21 LAB
ANION GAP SERPL CALC-SCNC: 7 MMOL/L — SIGNIFICANT CHANGE UP (ref 5–17)
BUN SERPL-MCNC: 18 MG/DL — SIGNIFICANT CHANGE UP (ref 7–23)
CALCIUM SERPL-MCNC: 8.3 MG/DL — LOW (ref 8.5–10.1)
CHLORIDE SERPL-SCNC: 111 MMOL/L — HIGH (ref 96–108)
CO2 SERPL-SCNC: 20 MMOL/L — LOW (ref 22–31)
CREAT SERPL-MCNC: 0.88 MG/DL — SIGNIFICANT CHANGE UP (ref 0.5–1.3)
EGFR: 85 ML/MIN/1.73M2 — SIGNIFICANT CHANGE UP
EGFR: 85 ML/MIN/1.73M2 — SIGNIFICANT CHANGE UP
GLUCOSE SERPL-MCNC: 94 MG/DL — SIGNIFICANT CHANGE UP (ref 70–99)
HCT VFR BLD CALC: 28.8 % — LOW (ref 39–50)
HGB BLD-MCNC: 9.5 G/DL — LOW (ref 13–17)
HIV 1 & 2 AB SERPL IA.RAPID: SIGNIFICANT CHANGE UP
MAGNESIUM SERPL-MCNC: 1.8 MG/DL — SIGNIFICANT CHANGE UP (ref 1.6–2.6)
MCHC RBC-ENTMCNC: 32.2 PG — SIGNIFICANT CHANGE UP (ref 27–34)
MCHC RBC-ENTMCNC: 33 G/DL — SIGNIFICANT CHANGE UP (ref 32–36)
MCV RBC AUTO: 97.6 FL — SIGNIFICANT CHANGE UP (ref 80–100)
NRBC BLD AUTO-RTO: 0 /100 WBCS — SIGNIFICANT CHANGE UP (ref 0–0)
PHOSPHATE SERPL-MCNC: 2.9 MG/DL — SIGNIFICANT CHANGE UP (ref 2.5–4.5)
PLATELET # BLD AUTO: 224 K/UL — SIGNIFICANT CHANGE UP (ref 150–400)
POTASSIUM SERPL-MCNC: 4 MMOL/L — SIGNIFICANT CHANGE UP (ref 3.5–5.3)
POTASSIUM SERPL-SCNC: 4 MMOL/L — SIGNIFICANT CHANGE UP (ref 3.5–5.3)
RBC # BLD: 2.95 M/UL — LOW (ref 4.2–5.8)
RBC # FLD: 14.2 % — SIGNIFICANT CHANGE UP (ref 10.3–14.5)
SODIUM SERPL-SCNC: 138 MMOL/L — SIGNIFICANT CHANGE UP (ref 135–145)
WBC # BLD: 13 K/UL — HIGH (ref 3.8–10.5)
WBC # FLD AUTO: 13 K/UL — HIGH (ref 3.8–10.5)

## 2025-02-21 PROCEDURE — G0545: CPT

## 2025-02-21 PROCEDURE — 99232 SBSQ HOSP IP/OBS MODERATE 35: CPT

## 2025-02-21 RX ADMIN — Medication 25 GRAM(S): at 02:32

## 2025-02-21 RX ADMIN — AMLODIPINE BESYLATE 5 MILLIGRAM(S): 10 TABLET ORAL at 05:50

## 2025-02-21 RX ADMIN — OXYBUTYNIN CHLORIDE 5 MILLIGRAM(S): 5 TABLET, FILM COATED, EXTENDED RELEASE ORAL at 05:50

## 2025-02-21 RX ADMIN — Medication 25 GRAM(S): at 08:44

## 2025-02-21 RX ADMIN — Medication 81 MILLIGRAM(S): at 14:21

## 2025-02-21 RX ADMIN — OXYBUTYNIN CHLORIDE 5 MILLIGRAM(S): 5 TABLET, FILM COATED, EXTENDED RELEASE ORAL at 18:21

## 2025-02-21 RX ADMIN — Medication 40 MILLIGRAM(S): at 08:43

## 2025-02-21 RX ADMIN — Medication 5 MILLIGRAM(S): at 14:21

## 2025-02-21 RX ADMIN — Medication 25 GRAM(S): at 22:53

## 2025-02-21 RX ADMIN — MONTELUKAST SODIUM 10 MILLIGRAM(S): 10 TABLET ORAL at 21:35

## 2025-02-21 RX ADMIN — Medication 25 GRAM(S): at 14:22

## 2025-02-21 RX ADMIN — ATORVASTATIN CALCIUM 10 MILLIGRAM(S): 80 TABLET, FILM COATED ORAL at 21:35

## 2025-02-21 RX ADMIN — ENOXAPARIN SODIUM 40 MILLIGRAM(S): 100 INJECTION SUBCUTANEOUS at 09:43

## 2025-02-21 RX ADMIN — TAMSULOSIN HYDROCHLORIDE 0.4 MILLIGRAM(S): 0.4 CAPSULE ORAL at 21:35

## 2025-02-22 ENCOUNTER — TRANSCRIPTION ENCOUNTER (OUTPATIENT)
Age: 85
End: 2025-02-22

## 2025-02-22 VITALS
SYSTOLIC BLOOD PRESSURE: 125 MMHG | OXYGEN SATURATION: 96 % | RESPIRATION RATE: 18 BRPM | TEMPERATURE: 97 F | HEART RATE: 80 BPM | DIASTOLIC BLOOD PRESSURE: 66 MMHG

## 2025-02-22 PROCEDURE — 99239 HOSP IP/OBS DSCHRG MGMT >30: CPT

## 2025-02-22 PROCEDURE — 73502 X-RAY EXAM HIP UNI 2-3 VIEWS: CPT | Mod: 26,RT

## 2025-02-22 RX ORDER — OXYBUTYNIN CHLORIDE 5 MG/1
1 TABLET, FILM COATED, EXTENDED RELEASE ORAL
Qty: 60 | Refills: 0
Start: 2025-02-22 | End: 2025-03-23

## 2025-02-22 RX ORDER — LEVOFLOXACIN 25 MG/ML
1 SOLUTION ORAL
Qty: 4 | Refills: 0
Start: 2025-02-22 | End: 2025-02-25

## 2025-02-22 RX ORDER — MELOXICAM 7.5 MG
1 TABLET ORAL
Qty: 0 | Refills: 0 | DISCHARGE

## 2025-02-22 RX ADMIN — AMLODIPINE BESYLATE 5 MILLIGRAM(S): 10 TABLET ORAL at 05:13

## 2025-02-22 RX ADMIN — OXYBUTYNIN CHLORIDE 5 MILLIGRAM(S): 5 TABLET, FILM COATED, EXTENDED RELEASE ORAL at 05:13

## 2025-02-22 RX ADMIN — Medication 40 MILLIGRAM(S): at 08:35

## 2025-02-22 RX ADMIN — Medication 81 MILLIGRAM(S): at 11:32

## 2025-02-22 RX ADMIN — Medication 25 GRAM(S): at 05:14

## 2025-02-22 RX ADMIN — Medication 5 MILLIGRAM(S): at 11:32

## 2025-02-22 RX ADMIN — Medication 25 GRAM(S): at 14:41

## 2025-02-22 RX ADMIN — OXYBUTYNIN CHLORIDE 5 MILLIGRAM(S): 5 TABLET, FILM COATED, EXTENDED RELEASE ORAL at 16:50

## 2025-02-22 RX ADMIN — ENOXAPARIN SODIUM 40 MILLIGRAM(S): 100 INJECTION SUBCUTANEOUS at 11:21

## 2025-02-25 DIAGNOSIS — N40.0 BENIGN PROSTATIC HYPERPLASIA WITHOUT LOWER URINARY TRACT SYMPTOMS: ICD-10-CM

## 2025-02-25 DIAGNOSIS — E87.6 HYPOKALEMIA: ICD-10-CM

## 2025-02-25 DIAGNOSIS — Z11.52 ENCOUNTER FOR SCREENING FOR COVID-19: ICD-10-CM

## 2025-02-25 DIAGNOSIS — Z79.82 LONG TERM (CURRENT) USE OF ASPIRIN: ICD-10-CM

## 2025-02-25 DIAGNOSIS — Z79.899 OTHER LONG TERM (CURRENT) DRUG THERAPY: ICD-10-CM

## 2025-02-25 DIAGNOSIS — D63.8 ANEMIA IN OTHER CHRONIC DISEASES CLASSIFIED ELSEWHERE: ICD-10-CM

## 2025-02-25 DIAGNOSIS — E78.5 HYPERLIPIDEMIA, UNSPECIFIED: ICD-10-CM

## 2025-02-25 DIAGNOSIS — Z96.641 PRESENCE OF RIGHT ARTIFICIAL HIP JOINT: ICD-10-CM

## 2025-02-25 DIAGNOSIS — A41.9 SEPSIS, UNSPECIFIED ORGANISM: ICD-10-CM

## 2025-02-25 DIAGNOSIS — I10 ESSENTIAL (PRIMARY) HYPERTENSION: ICD-10-CM

## 2025-02-25 DIAGNOSIS — A41.51 SEPSIS DUE TO ESCHERICHIA COLI [E. COLI]: ICD-10-CM

## 2025-02-25 LAB
CULTURE RESULTS: SIGNIFICANT CHANGE UP
CULTURE RESULTS: SIGNIFICANT CHANGE UP
SPECIMEN SOURCE: SIGNIFICANT CHANGE UP
SPECIMEN SOURCE: SIGNIFICANT CHANGE UP

## 2025-06-03 ENCOUNTER — APPOINTMENT (OUTPATIENT)
Dept: ORTHOPEDIC SURGERY | Facility: CLINIC | Age: 85
End: 2025-06-03

## 2025-06-03 DIAGNOSIS — M17.12 UNILATERAL PRIMARY OSTEOARTHRITIS, LEFT KNEE: ICD-10-CM

## 2025-06-03 DIAGNOSIS — M17.11 UNILATERAL PRIMARY OSTEOARTHRITIS, RIGHT KNEE: ICD-10-CM

## 2025-06-03 PROCEDURE — 20610 DRAIN/INJ JOINT/BURSA W/O US: CPT | Mod: 50

## 2025-06-10 ENCOUNTER — APPOINTMENT (OUTPATIENT)
Dept: ORTHOPEDIC SURGERY | Facility: CLINIC | Age: 85
End: 2025-06-10

## 2025-07-07 NOTE — ED PROVIDER NOTE - ED STEMI HIDDEN
hide
Bed in low position, brakes on/Environment clear of unused equipment, furniture's in place, clear of hazards

## 2025-07-15 ENCOUNTER — APPOINTMENT (OUTPATIENT)
Dept: ORTHOPEDIC SURGERY | Facility: CLINIC | Age: 85
End: 2025-07-15
Payer: MEDICARE

## 2025-07-15 PROCEDURE — 20610 DRAIN/INJ JOINT/BURSA W/O US: CPT | Mod: 50

## 2025-07-22 ENCOUNTER — APPOINTMENT (OUTPATIENT)
Dept: ORTHOPEDIC SURGERY | Facility: CLINIC | Age: 85
End: 2025-07-22
Payer: MEDICARE

## 2025-07-22 PROCEDURE — 20610 DRAIN/INJ JOINT/BURSA W/O US: CPT | Mod: 50

## 2025-07-31 ENCOUNTER — APPOINTMENT (OUTPATIENT)
Dept: ORTHOPEDIC SURGERY | Facility: CLINIC | Age: 85
End: 2025-07-31
Payer: MEDICARE

## 2025-07-31 PROCEDURE — 20610 DRAIN/INJ JOINT/BURSA W/O US: CPT | Mod: 50

## 2025-08-07 ENCOUNTER — APPOINTMENT (OUTPATIENT)
Dept: ORTHOPEDIC SURGERY | Facility: CLINIC | Age: 85
End: 2025-08-07
Payer: MEDICARE

## 2025-08-07 DIAGNOSIS — M17.12 UNILATERAL PRIMARY OSTEOARTHRITIS, LEFT KNEE: ICD-10-CM

## 2025-08-07 DIAGNOSIS — M17.11 UNILATERAL PRIMARY OSTEOARTHRITIS, RIGHT KNEE: ICD-10-CM

## 2025-08-07 PROCEDURE — 20610 DRAIN/INJ JOINT/BURSA W/O US: CPT | Mod: 50

## (undated) DEVICE — STRYKER ACM MIXING BOWL 180GM W CARTRIDGE

## (undated) DEVICE — WARMING BLANKET UPPER ADULT

## (undated) DEVICE — SAW BLADE STRYKER RECIPROCATING 77.6X0.77X11.2MM

## (undated) DEVICE — VAGINAL PACKING 2 X 6"

## (undated) DEVICE — FRA-ESU BOVIE FORCE TRIAD T6D04558DX: Type: DURABLE MEDICAL EQUIPMENT

## (undated) DEVICE — PREP CHLORAPREP HI-LITE ORANGE 26ML

## (undated) DEVICE — DRSG DERMABOND PRINEO 22CM

## (undated) DEVICE — SUT ETHIBOND 5 30" V-40

## (undated) DEVICE — SUT VICRYL 1 27" CPX UNDYED

## (undated) DEVICE — DRAPE BAR ORTHOMAX

## (undated) DEVICE — SYR LUER LOK 20CC

## (undated) DEVICE — DRAPE SPLIT SHEET 77" X 120"

## (undated) DEVICE — SYR ASEPTO

## (undated) DEVICE — DRAPE STICKY U BLUE 60 X 84"

## (undated) DEVICE — SOL IRR POUR NS 0.9% 1000ML

## (undated) DEVICE — DRSG AQUACEL 3.5 X 14"

## (undated) DEVICE — DRAPE 3/4 SHEET W REINFORCEMENT 56X77"

## (undated) DEVICE — DRAPE IOBAN 33" X 23"

## (undated) DEVICE — SUT QUILL PDO 2 36CM 40MM

## (undated) DEVICE — MARKING PEN W RULER

## (undated) DEVICE — SUT MONOCRYL 3-0 27" SH

## (undated) DEVICE — DRAPE HIP W POUCHES 87X115X134"

## (undated) DEVICE — GLV 8.5 PROTEXIS (WHITE)

## (undated) DEVICE — DRAPE U (CLEAR) 47 X 51"

## (undated) DEVICE — GLV 8.5 PROTEXIS (BLUE)

## (undated) DEVICE — FRAZIER SUCTION TIP 18FR

## (undated) DEVICE — DRSG COBAN 6"

## (undated) DEVICE — DRILL BIT STRYKER ORTHO 4X25MM

## (undated) DEVICE — DRAPE SURGICAL #1010

## (undated) DEVICE — STRYKER BREAKAWAY FEMORAL NOZZLE 200MM

## (undated) DEVICE — VENODYNE/SCD SLEEVE CALF MEDIUM

## (undated) DEVICE — PACK BASIC

## (undated) DEVICE — ZIMMER PULSAVAC PLUS FAN KIT

## (undated) DEVICE — POSITIONER FOAM ABDUCTION PILLOW MED (PINK)

## (undated) DEVICE — SOL IRR BAG NS 0.9% 1000ML

## (undated) DEVICE — BLADE SURGICAL #20 CARBON